# Patient Record
Sex: FEMALE | Race: WHITE | Employment: FULL TIME | URBAN - METROPOLITAN AREA
[De-identification: names, ages, dates, MRNs, and addresses within clinical notes are randomized per-mention and may not be internally consistent; named-entity substitution may affect disease eponyms.]

---

## 2017-02-06 ENCOUNTER — HOSPITAL ENCOUNTER (OUTPATIENT)
Dept: MAMMOGRAPHY | Age: 42
Discharge: HOME | End: 2017-02-06
Attending: FAMILY MEDICINE
Payer: COMMERCIAL

## 2017-02-06 DIAGNOSIS — Z12.31 VISIT FOR SCREENING MAMMOGRAM: ICD-10-CM

## 2017-02-06 PROCEDURE — 77067 SCR MAMMO BI INCL CAD: CPT

## 2017-04-28 ENCOUNTER — OFFICE VISIT (OUTPATIENT)
Dept: CARDIOLOGY CLINIC | Age: 42
End: 2017-04-28

## 2017-04-28 VITALS
HEIGHT: 65 IN | HEART RATE: 88 BPM | WEIGHT: 213 LBS | BODY MASS INDEX: 35.49 KG/M2 | SYSTOLIC BLOOD PRESSURE: 100 MMHG | DIASTOLIC BLOOD PRESSURE: 70 MMHG | OXYGEN SATURATION: 98 %

## 2017-04-28 DIAGNOSIS — R01.0 FUNCTIONAL MURMUR: Primary | ICD-10-CM

## 2017-04-28 RX ORDER — LEVOTHYROXINE SODIUM 25 UG/1
75 TABLET ORAL
COMMUNITY

## 2017-04-28 RX ORDER — CHOLECALCIFEROL TAB 125 MCG (5000 UNIT) 125 MCG
TAB ORAL
COMMUNITY

## 2017-04-28 RX ORDER — ACETAMINOPHEN 160 MG/5ML
SUSPENSION, ORAL (FINAL DOSE FORM) ORAL DAILY
COMMUNITY

## 2017-04-28 NOTE — PROGRESS NOTES
HPI: I saw Gem Gibson in my office today in cardiovascular evaluation due to a heart murmur. Dr. David Gibson is a very pleasant 38year old  female with history of recently diagnosed heart murmur by Dr. Cecilia Partida, her gynecologist.  She had a completely normal clinical examination when I saw her about a year ago except for very soft grade 9-8-5 systolic ejection murmur which I thought could simply be a flow murmur murmur or possibly related to bicuspid aortic valve. I decided to do an echocardiogram which was completed in April 8, 2016 and was completely within normal limits with a normally trileaflet aortic valve without any significant sclerosis and it was felt that her heart murmur was simply a functional flow murmur. She comes into the office today and relates that she is doing quite well. She just had a knee replacement with the last few months by Dr. Kaela Richardson and is recovering from that surgery and seems to be doing quite well. Encounter Diagnosis   Name Primary?  Functional murmur Yes       Discussion: This lady simply is a very mild functional flow murmur which I think was lateral last year because she had an anemia. In view of the fact that her echocardiogram appeared to be completely normal I told her that I did not feel that she needs to follow-up with me in the future and could just follow-up if any new cardiovascular symptoms developed.     PCP: Juan C Armando MD      Past Medical History:   Diagnosis Date    Allergic rhinitis 2/11/2010    Arrhythmia     heart murmur    Asthma 2/11/2010    controlled without meds    Chronic back pain 2/11/2010    Fracture of trapezoidal bone of wrist ,96    left    GERD (gastroesophageal reflux disease) 2/11/2010    Migraine headache 2/11/2010    Osteoarthritis of left knee 10/6/2016    Osteoarthritis of left knee 10/6/2016    Seborrheic dermatitis 2/11/2010    Sleep apnea     was tested, but does not need a CPAP       Past Surgical History:   Procedure Laterality Date    HX HYSTERECTOMY  2010    HX ORTHOPAEDIC  summer '07    left foot screw, fx right foot '06    HX TONSILLECTOMY         Current Outpatient Rx   Name  Route  Sig  Dispense  Refill    Cetirizine 10 mg cap    Oral    Take  by mouth.  gabapentin (NEURONTIN) 600 mg tablet    Oral    Take  by mouth daily.  FERROUS FUMARATE (IRON PO)    Oral    Take  by mouth daily.  FOLIC ACID/MULTIVIT-MIN/LUTEIN (CENTRUM SILVER PO)    Oral    Take  by mouth.  ibuprofen (MOTRIN) 400 mg tablet    Oral    Take 1 Tab by mouth every six (6) hours as needed. With food    360 Tab    3      loratadine (CLARITIN) 10 mg tablet    Oral    Take 1 Tab by mouth daily as needed. 90 Tab    3      GLUC HCL/GLUC SAUCEDA/AC-D-GLUCOS (GLUCOSAMINE COMPLEX PO)    Oral    Take  by mouth two (2) times a day.  omega-3 fatty acids-vitamin e (FISH OIL) 1,000 mg Cap    Oral    Take  by mouth two (2) times a day. Allergies   Allergen Reactions    Bee Sting [Sting, Bee] Unknown (comments)    Venom-Wasp Unknown (comments)       Social History :  Social History   Substance Use Topics    Smoking status: Never Smoker    Smokeless tobacco: Never Used    Alcohol use 4.0 oz/week     4 Cans of beer, 4 Standard drinks or equivalent per week      Comment: a little of both        Family History: family history includes Arthritis-rheumatoid in her maternal grandmother, mother, and paternal grandmother; Cancer in an other family member; Diabetes in her maternal grandmother; Heart Disease in her maternal grandmother. Review of Systems:        Constitutional: Negative. Respiratory: Negative. Cardiovascular: Positive for leg swelling. Negative for chest pain, palpitations, orthopnea, claudication and PND. Gastrointestinal: Negative. Musculoskeletal: Negative.             Physical Exam:    The patient is a cooperative, alert, well developed, well nourished 42 y.o.  female who is in no acute distress at the time of the examination. Visit Vitals    /70    Pulse 88    Ht 5' 5\" (1.651 m)    Wt 96.6 kg (213 lb)    SpO2 98%    BMI 35.45 kg/m2       HEENT: Conjuctiva white, mucosa moist, no pallor or cyanosis. NECK: Supple without masses, tenderness or thyromegaly. There was no jugular venous distention. Carotid are full bilaterally without bruits. CHEST: Symmetrical with good excursion. LUNGS: Clear to auscultation in all fields. HEART: The apex is not displaced. There were no lifts, thrills or heaves. There is a normal S1 and S2. There is a grade I/VI MONTRELL along the LSB with radiation to the base without appreciable murmurs, rubs, clicks, or gallops auscultated. ABDOMEN: Soft without masses, tenderness or organomegaly. EXTREMITIES: Full peripheral pulses without peripheral edema. Review of Data: See PMH and Cardiology and Imaging sections for cardiac testing  No results found for: CHOL, CHOLX, CHLST, CHOLV, HDL, LDL, DLDL, LDLC, DLDLP, TGL, TGLX, TRIGL, TRIGP, CHHD, CHHDX    Results for orders placed or performed in visit on 04/28/17   AMB POC EKG ROUTINE W/ 12 LEADS, INTER & REP     Status: None    Narrative    Normal sinus rhythm, rate 88. This EKG is within normal limits and similar to the EKG of April 7, 2016. Carlee Melissa D.O., F.A.C.C. Cardiovascular Specialists  [facility] [place]  [address] [place] [address]. [address] [facility]  [phone]    PLEASE NOTE:  This document has been produced using voice recognition software. Unrecognized errors in transcription may be present.

## 2017-04-28 NOTE — PROGRESS NOTES
1. Have you been to the ER, urgent care clinic since your last visit? Hospitalized since your last visit? Yes 10/13/16-10/14/16  Left knee femoral joint replacement     2. Have you seen or consulted any other health care providers outside of the [address] [place] [address] since your last visit? Include any pap smears or colon screening.  No

## 2017-04-28 NOTE — PROGRESS NOTES
Review of Systems   Constitutional: Negative. Respiratory: Negative. Cardiovascular: Positive for leg swelling. Negative for chest pain, palpitations, orthopnea, claudication and PND. Gastrointestinal: Negative. Musculoskeletal: Negative.

## 2018-02-13 ENCOUNTER — HOSPITAL ENCOUNTER (OUTPATIENT)
Dept: MAMMOGRAPHY | Age: 43
Discharge: HOME | End: 2018-02-13
Attending: FAMILY MEDICINE
Payer: COMMERCIAL

## 2018-02-13 DIAGNOSIS — Z12.31 VISIT FOR SCREENING MAMMOGRAM: ICD-10-CM

## 2018-02-13 PROCEDURE — 77067 SCR MAMMO BI INCL CAD: CPT

## 2018-02-13 PROCEDURE — 77063 BREAST TOMOSYNTHESIS BI: CPT

## 2018-05-29 ENCOUNTER — OFFICE VISIT (OUTPATIENT)
Dept: CARDIOLOGY CLINIC | Age: 43
End: 2018-05-29

## 2018-05-29 VITALS
OXYGEN SATURATION: 97 % | BODY MASS INDEX: 35.49 KG/M2 | DIASTOLIC BLOOD PRESSURE: 72 MMHG | HEIGHT: 65 IN | WEIGHT: 213 LBS | HEART RATE: 73 BPM | SYSTOLIC BLOOD PRESSURE: 116 MMHG

## 2018-05-29 DIAGNOSIS — R01.1 MURMUR: Primary | ICD-10-CM

## 2018-05-29 PROBLEM — E66.01 SEVERE OBESITY (BMI 35.0-39.9) WITH COMORBIDITY (HCC): Status: ACTIVE | Noted: 2018-05-29

## 2018-05-29 NOTE — PROGRESS NOTES
HPI:  I saw Vipin Wyman in my office today in cardiovascular evaluation due to a heart murmur. DrJabier Wyman is a very pleasant 38year old  female with history of recently diagnosed heart murmur by Dr. Mitchel Gibson, her gynecologist.  She had a completely normal clinical examination when I saw her about a year ago except for very soft grade 2-5/9 systolic ejection murmur which I thought could simply be a flow murmur murmur or possibly related to bicuspid aortic valve. I decided to do an echocardiogram which was completed in April 8, 2016 and was completely within normal limits with a normally trileaflet aortic valve without any significant sclerosis and it was felt that her heart murmur was simply a functional flow murmur. Encounter Diagnosis   Name Primary?  Functional systolic murmur Yes       Discussion: This lady continues to do quite well from a cardiovascular vantage. She is not having any symptoms to suggest the development of symptomatic obstructive coronary disease or heart failure and her heart murmur continues to be very minimal and clearly functional in my view.       Consequently, I have told her that unless she has new symptoms of chest pain, shortness of breath, or her family physician feels that her heart murmurs getting worse and wants us to reevaluate her that she should just follow-up with her family physician Dr. Jack Croft and I will see her on an as-needed basis in the future    PCP: Amelia Whaley MD      Past Medical History:   Diagnosis Date    Allergic rhinitis 2/11/2010    Arrhythmia     heart murmur    Asthma 2/11/2010    controlled without meds    Chronic back pain 2/11/2010    Fracture of trapezoidal bone of wrist ,96    left    GERD (gastroesophageal reflux disease) 2/11/2010    Migraine headache 2/11/2010    Osteoarthritis of left knee 10/6/2016    Osteoarthritis of left knee 10/6/2016    Seborrheic dermatitis 2/11/2010    Sleep apnea     was tested, but does not need a CPAP       Past Surgical History:   Procedure Laterality Date    HX HYSTERECTOMY  2010    HX ORTHOPAEDIC  summer '07    left foot screw, fx right foot '06    HX TONSILLECTOMY         Current Outpatient Rx   Name  Route  Sig  Dispense  Refill    Cetirizine 10 mg cap    Oral    Take  by mouth.  gabapentin (NEURONTIN) 600 mg tablet    Oral    Take  by mouth daily.  FERROUS FUMARATE (IRON PO)    Oral    Take  by mouth daily.  FOLIC ACID/MULTIVIT-MIN/LUTEIN (CENTRUM SILVER PO)    Oral    Take  by mouth.  ibuprofen (MOTRIN) 400 mg tablet    Oral    Take 1 Tab by mouth every six (6) hours as needed. With food    360 Tab    3      loratadine (CLARITIN) 10 mg tablet    Oral    Take 1 Tab by mouth daily as needed. 90 Tab    3      GLUC HCL/GLUC SAUCEDA/AC-D-GLUCOS (GLUCOSAMINE COMPLEX PO)    Oral    Take  by mouth two (2) times a day.  omega-3 fatty acids-vitamin e (FISH OIL) 1,000 mg Cap    Oral    Take  by mouth two (2) times a day. Allergies   Allergen Reactions    Bee Sting [Sting, Bee] Unknown (comments)    Venom-Wasp Unknown (comments)       Social History :  Social History   Substance Use Topics    Smoking status: Never Smoker    Smokeless tobacco: Never Used    Alcohol use 4.0 oz/week     4 Cans of beer, 4 Standard drinks or equivalent per week      Comment: a little of both        Family History: family history includes Arthritis-rheumatoid in her maternal grandmother, mother, and paternal grandmother; Cancer in an other family member; Diabetes in her maternal grandmother; Heart Disease in her maternal grandmother. Review of Systems:   Constitutional: Negative. Respiratory: Negative. Cardiovascular: Negative. Gastrointestinal: Negative. Musculoskeletal: Negative for falls, joint pain and myalgias. Neurological: Negative for dizziness.      Physical Exam:    The patient is a cooperative, alert, well developed, well nourished 36 y.o.  female who is in no acute distress at the time of the examination. Visit Vitals    /72    Pulse 73    Ht 5' 5\" (1.651 m)    Wt 96.6 kg (213 lb)    SpO2 97%    BMI 35.45 kg/m2       HEENT: Conjuctiva white, mucosa moist, no pallor or cyanosis. NECK: Supple without masses, tenderness or thyromegaly. There was no jugular venous distention. Carotid are full bilaterally without bruits. CHEST: Symmetrical with good excursion. LUNGS: Clear to auscultation in all fields. HEART: The apex is not displaced. There were no lifts, thrills or heaves. There is a normal S1 and S2. There is a grade I/VI MONTRELL along the LSB with radiation to the base without appreciable murmurs, rubs, clicks, or gallops auscultated. ABDOMEN: Soft without masses, tenderness or organomegaly. EXTREMITIES: Full peripheral pulses without peripheral edema. Review of Data: See PMH and Cardiology and Imaging sections for cardiac testing      Results for orders placed or performed in visit on 05/29/18   AMB POC EKG ROUTINE W/ 12 LEADS, INTER & REP     Status: None    Narrative    Normal sinus rhythm, rate 73. Low voltage in the precordial leads and borderline low voltage in the limb leads, but otherwise the tracing is within normal limits. This is similar to the tracing of April 28, 2017. Shanti Canales D.O., F.A.C.C. Cardiovascular Specialists  [facility] [place]  [address] [place] [address]. [address] [facility]  [phone]    PLEASE NOTE:  This document has been produced using voice recognition software. Unrecognized errors in transcription may be present.

## 2018-05-29 NOTE — PROGRESS NOTES
1. Have you been to the ER, urgent care clinic since your last visit? Hospitalized since your last visit?no    2. Have you seen or consulted any other health care providers outside of the [facility] since your last visit? Include any pap smears or colon screening.  no

## 2018-05-29 NOTE — PROGRESS NOTES
Review of Systems   Constitutional: Negative. Respiratory: Negative. Cardiovascular: Negative. Gastrointestinal: Negative. Musculoskeletal: Negative for falls, joint pain and myalgias. Neurological: Negative for dizziness.

## 2019-04-12 ENCOUNTER — HOSPITAL ENCOUNTER (OUTPATIENT)
Dept: MAMMOGRAPHY | Age: 44
Discharge: HOME | End: 2019-04-12
Attending: FAMILY MEDICINE
Payer: COMMERCIAL

## 2019-04-12 DIAGNOSIS — Z12.31 VISIT FOR SCREENING MAMMOGRAM: ICD-10-CM

## 2019-04-12 PROCEDURE — 77063 BREAST TOMOSYNTHESIS BI: CPT

## 2019-07-31 ENCOUNTER — OFFICE VISIT (OUTPATIENT)
Dept: CARDIOLOGY CLINIC | Age: 44
End: 2019-07-31

## 2019-07-31 VITALS
SYSTOLIC BLOOD PRESSURE: 116 MMHG | BODY MASS INDEX: 38.15 KG/M2 | WEIGHT: 229 LBS | HEIGHT: 65 IN | HEART RATE: 63 BPM | DIASTOLIC BLOOD PRESSURE: 72 MMHG | OXYGEN SATURATION: 98 %

## 2019-07-31 DIAGNOSIS — R01.1 MURMUR: Primary | ICD-10-CM

## 2019-07-31 NOTE — PROGRESS NOTES
HPI:   I saw Maria C Henriquez in my office today in cardiovascular evaluation due to a heart murmur. Dr. Adiel Henriquez is a very pleasant 39year old  female with history of recently diagnosed heart murmur by Dr. Loli Lafleur, her gynecologist. Amy Aguirre had a completely normal clinical examination when I saw her initially except for very soft grade 2-3/9 systolic ejection murmur which I thought could simply be a flow murmur murmur or possibly related to bicuspid aortic valve.  I decided to do an echocardiogram which was completed in 2016 and was completely within normal limits with a normally trileaflet aortic valve without any significant sclerosis and it was felt that her heart murmur was simply a functional flow murmur. She has a mother who  of congestive heart failure and the patient herself is concerned about the development of heart failure over time so she has been following with me on an annual basis. She comes in today and relates that she is feeling fine continuing to be active and denies any cardiovascular symptomatology. Encounter Diagnosis   Name Primary?  Functional systolic murmur Yes       Discussion: This lady appears to be doing quite well from a cardiovascular vantage and I have no recommendations for change. Her functional heart murmur has disappeared and she has no heart murmur today and she is not having any symptoms to suggest the development of heart failure at this juncture. I do not have a copy of her most recent cholesterol and I told her that if her cholesterol is borderline elevated as I suspect it may be that as she gets older particularly in the 50 timeframe she might want to consider getting a coronary calcium score since if that was elevated we might treat her cholesterol when we otherwise would not just related to her cholesterol level and risk factors.     I told her to give me a call if she has any problems with shortness of breath on exertion, orthopnea, palpitations, or chest pain or fatigue issues and since she is otherwise doing well I will plan to see her again in a year. PCP: Param Bowman MD      Past Medical History:   Diagnosis Date    Allergic rhinitis 2/11/2010    Arrhythmia     heart murmur    Asthma 2/11/2010    controlled without meds    Chronic back pain 2/11/2010    Fracture of trapezoidal bone of wrist ,96    left    GERD (gastroesophageal reflux disease) 2/11/2010    Migraine headache 2/11/2010    Osteoarthritis of left knee 10/6/2016    Osteoarthritis of left knee 10/6/2016    Seborrheic dermatitis 2/11/2010    Sleep apnea     was tested, but does not need a CPAP       Past Surgical History:   Procedure Laterality Date    HX HYSTERECTOMY  2010    HX ORTHOPAEDIC  summer '07    left foot screw, fx right foot '06    HX TONSILLECTOMY         Current Outpatient Medications   Medication Sig    conjugated estrogens (PREMARIN) 0.625 mg tablet Take  by mouth.  levothyroxine (SYNTHROID) 25 mcg tablet Take 75 mcg by mouth Daily (before breakfast).  omega 3-dha-epa-fish oil 100-160-1,000 mg cap Take  by mouth.  cholecalciferol, VITAMIN D3, (VITAMIN D3) 5,000 unit tab tablet Take  by mouth daily.  coenzyme Q-10 (CO Q-10) 200 mg capsule Take  by mouth daily.  GINKGO BILOBA (GINKOBA PO) Take  by mouth.  ferrous sulfate (IRON) 325 mg (65 mg iron) tablet Take  by mouth Daily (before breakfast). Indications: IRON DEFICIENCY ANEMIA    Cetirizine 10 mg cap Take  by mouth.  gabapentin (NEURONTIN) 600 mg tablet Take 600 mg by mouth daily. Indications: NEUROPATHIC PAIN    FOLIC ACID/MULTIVIT-MIN/LUTEIN (CENTRUM SILVER PO) Take  by mouth. No current facility-administered medications for this visit.          Allergies   Allergen Reactions    Bee Sting [Sting, Bee] Unknown (comments)    Venom-Wasp Unknown (comments)       Social History :  Social History     Tobacco Use    Smoking status: Never Smoker    Smokeless tobacco: Never Used   Substance Use Topics    Alcohol use: Yes     Alcohol/week: 6.7 standard drinks     Types: 4 Cans of beer, 4 Standard drinks or equivalent per week     Comment: a little of both        Family History: family history includes Arthritis-rheumatoid in her maternal grandmother, mother, and paternal grandmother; Cancer in an other family member; Diabetes in her maternal grandmother; Heart Disease in her maternal grandmother. Review of Systems:   Constitutional: Negative. Respiratory: Negative. Cardiovascular: Negative. Gastrointestinal: Negative. Musculoskeletal: Negative for falls, joint pain and myalgias. Neurological: Negative for dizziness. Physical Exam:    The patient is a cooperative, alert, well developed, well nourished 39 y.o.  female who is in no acute distress at the time of the examination. Visit Vitals  /72   Pulse 63   Ht 5' 5\" (1.651 m)   Wt 103.9 kg (229 lb)   SpO2 98%   BMI 38.11 kg/m²       HEENT: Conjuctiva white, mucosa moist, no pallor or cyanosis. NECK: Supple without masses, tenderness or thyromegaly. There was no jugular venous distention. Carotid are full bilaterally without bruits. CHEST: Symmetrical with good excursion. LUNGS: Clear to auscultation in all fields. HEART: The apex is not displaced. There were no lifts, thrills or heaves. There is a normal S1 and S2 without appreciable murmurs, rubs, clicks, or gallops auscultated. ABDOMEN: Soft without masses, tenderness or organomegaly. EXTREMITIES: Full peripheral pulses without peripheral edema. Review of Data: See PMH and Cardiology and Imaging sections for cardiac testing      Results for orders placed or performed in visit on 07/31/19   AMB POC EKG ROUTINE W/ 12 LEADS, INTER & REP     Status: None    Narrative    Normal sinus rhythm, rate 63. Low voltage throughout the tracing. This EKG is otherwise within normal limits and similar to the EKG of May 29, 2018. Thiago Acevedo D.O., FJANICEC. Cardiovascular Specialists  [facility] [place]  [address]. [address] [facility]  [phone]    PLEASE NOTE:  This document has been produced using voice recognition software. Unrecognized errors in transcription may be present.

## 2020-07-06 ENCOUNTER — HOSPITAL ENCOUNTER (OUTPATIENT)
Dept: MAMMOGRAPHY | Age: 45
Discharge: HOME | End: 2020-07-06
Attending: FAMILY MEDICINE
Payer: COMMERCIAL

## 2020-07-06 DIAGNOSIS — Z12.31 VISIT FOR SCREENING MAMMOGRAM: ICD-10-CM

## 2020-07-06 PROCEDURE — 77063 BREAST TOMOSYNTHESIS BI: CPT

## 2020-07-22 ENCOUNTER — OFFICE VISIT (OUTPATIENT)
Dept: CARDIOLOGY CLINIC | Age: 45
End: 2020-07-22

## 2020-07-22 VITALS
SYSTOLIC BLOOD PRESSURE: 130 MMHG | HEIGHT: 65 IN | OXYGEN SATURATION: 98 % | WEIGHT: 224 LBS | BODY MASS INDEX: 37.32 KG/M2 | DIASTOLIC BLOOD PRESSURE: 70 MMHG | HEART RATE: 73 BPM

## 2020-07-22 DIAGNOSIS — R01.1 MURMUR: Primary | ICD-10-CM

## 2020-07-22 NOTE — PROGRESS NOTES
HPI:   I saw Bhupendra Juarez in my office today in cardiovascular evaluation due to a heart murmur. Dr. Daniel Juarez is a very pleasant 38year old  female with history of having a heart murmur diagnosed by Dr. Jeni Peoples, her gynecologist. Mike Kim had a completely normal clinical examination when I saw her initially except for very soft grade 6-1/9 systolic ejection murmur which I thought could simply be a flow murmur murmur or possibly related to bicuspid aortic valve.  I decided to do an echocardiogram which was completed in 2016 and was completely within normal limits with a normally trileaflet aortic valve without any significant sclerosis and it was felt that her heart murmur was simply a functional flow murmur. She has a mother who  of congestive heart failure and the patient herself is concerned about the development of heart failure over time so she has been following with me on an annual basis at her request.      She comes in today and relates that she is feeling fine continuing to be active and denies any cardiovascular symptomatology. Encounter Diagnosis   Name Primary?  Functional systolic murmur Yes       Discussion: This lady appears to be doing quite well from a cardiovascular vantage and I have no recommendations for change. Her functional heart murmur has disappeared and she has no heart murmur today and she is not having any symptoms to suggest the development of heart failure at this juncture. Her most recent lipid profile which was completed on May 5, 2020 showed total cholesterol 145 with triglycerides of 82, HDL of 70, VLDL of 16, and LDL of 59 which I think is a very good level for patient not on statin drugs and would suggest it would be unlikely for this lady to develop significant obstructive disease moving forward.     She otherwise appears to be doing well with only complaint being a little intermittent swelling in her ankles which I think is from venous insufficiency and since she otherwise is doing well I am simply going to have her followed on an annual basis due to her concern for the potential for developing issues moving forward by my associate Dr. Camilo Vallejo,     PCP: Adolfo Espinoza MD      Past Medical History:   Diagnosis Date    Allergic rhinitis 2/11/2010    Arrhythmia     heart murmur    Asthma 2/11/2010    controlled without meds    Chronic back pain 2/11/2010    Fracture of trapezoidal bone of wrist ,96    left    GERD (gastroesophageal reflux disease) 2/11/2010    Migraine headache 2/11/2010    Osteoarthritis of left knee 10/6/2016    Osteoarthritis of left knee 10/6/2016    Seborrheic dermatitis 2/11/2010    Sleep apnea     was tested, but does not need a CPAP       Past Surgical History:   Procedure Laterality Date    HX HYSTERECTOMY  2010    HX ORTHOPAEDIC  summer '07    left foot screw, fx right foot '06    HX TONSILLECTOMY         Current Outpatient Medications   Medication Sig    conjugated estrogens (PREMARIN) 0.625 mg tablet Take  by mouth.  levothyroxine (SYNTHROID) 25 mcg tablet Take 75 mcg by mouth Daily (before breakfast).  omega 3-dha-epa-fish oil 100-160-1,000 mg cap Take  by mouth.  cholecalciferol, VITAMIN D3, (VITAMIN D3) 5,000 unit tab tablet Take  by mouth daily.  coenzyme Q-10 (CO Q-10) 200 mg capsule Take  by mouth daily.  GINKGO BILOBA (GINKOBA PO) Take  by mouth.  ferrous sulfate (IRON) 325 mg (65 mg iron) tablet Take  by mouth Daily (before breakfast). Indications: IRON DEFICIENCY ANEMIA    Cetirizine 10 mg cap Take  by mouth.  FOLIC ACID/MULTIVIT-MIN/LUTEIN (CENTRUM SILVER PO) Take  by mouth.  gabapentin (NEURONTIN) 600 mg tablet Take 600 mg by mouth daily. Indications: NEUROPATHIC PAIN     No current facility-administered medications for this visit.          Allergies   Allergen Reactions    Bee Sting [Sting, Bee] Unknown (comments)    Venom-Wasp Unknown (comments)       Social History :  Social History     Tobacco Use    Smoking status: Never Smoker    Smokeless tobacco: Never Used   Substance Use Topics    Alcohol use: Yes     Alcohol/week: 6.7 standard drinks     Types: 4 Cans of beer, 4 Standard drinks or equivalent per week     Comment: a little of both        Family History: family history includes Arthritis-rheumatoid in her maternal grandmother, mother, and paternal grandmother; Cancer in an other family member; Diabetes in her maternal grandmother; Heart Disease in her maternal grandmother. Review of Systems:   Constitutional: Negative. Respiratory: Negative. Cardiovascular: Negative. Gastrointestinal: Negative. Musculoskeletal: Negative for falls, joint pain and myalgias. Neurological: Negative for dizziness. Physical Exam:    The patient is a cooperative, alert, well developed, well nourished [address] y.o.  female who is in no acute distress at the time of the examination. Visit Vitals  /70   Pulse 73   Ht 5' 5\" (1.651 m)   Wt 224 lb (101.6 kg)   SpO2 98%   BMI 37.28 kg/m²       HEENT: Conjuctiva white, mucosa moist, no pallor or cyanosis. NECK: Supple without masses, tenderness or thyromegaly. There was no jugular venous distention. Carotid are full bilaterally without bruits. CHEST: Symmetrical with good excursion. LUNGS: Clear to auscultation in all fields. HEART: The apex is not displaced. There were no lifts, thrills or heaves. There is a normal S1 and S2 without appreciable murmurs, rubs, clicks, or gallops auscultated. ABDOMEN: Soft without masses, tenderness or organomegaly. EXTREMITIES: Full peripheral pulses without peripheral edema. Review of Data: See PMH and Cardiology and Imaging sections for cardiac testing      Results for orders placed or performed in visit on 07/22/20   AMB POC EKG ROUTINE W/ 12 LEADS, INTER & REP     Status: None    Narrative    Normal sinus rhythm, rate 73. Low voltage in the precordial leads. This EKG is otherwise within normal limits and similar to the EKG of July 31, 2019. Randell Escalante D.O., FJANICEC. [facility]  [facility]  [address] [place] [address]. [address] [facility]  [phone]    PLEASE NOTE:  This document has been produced using voice recognition software. Unrecognized errors in transcription may be present.

## 2020-10-14 ENCOUNTER — APPOINTMENT (OUTPATIENT)
Dept: GENERAL RADIOLOGY | Age: 45
End: 2020-10-14
Attending: EMERGENCY MEDICINE
Payer: COMMERCIAL

## 2020-10-14 ENCOUNTER — HOSPITAL ENCOUNTER (EMERGENCY)
Age: 45
Discharge: HOME | End: 2020-10-15
Attending: EMERGENCY MEDICINE
Payer: COMMERCIAL

## 2020-10-14 VITALS
OXYGEN SATURATION: 96 % | WEIGHT: 224 LBS | DIASTOLIC BLOOD PRESSURE: 102 MMHG | SYSTOLIC BLOOD PRESSURE: 140 MMHG | HEART RATE: 83 BPM | TEMPERATURE: 98.4 F | RESPIRATION RATE: 16 BRPM | BODY MASS INDEX: 37.28 KG/M2

## 2020-10-14 DIAGNOSIS — R06.00 DYSPNEA, UNSPECIFIED TYPE: Primary | ICD-10-CM

## 2020-10-14 DIAGNOSIS — U07.1 COVID-19: ICD-10-CM

## 2020-10-14 LAB
ALBUMIN SERPL-MCNC: 3.6 G/DL (ref 3.4–5)
ALBUMIN/GLOB SERPL: 0.9 {RATIO} (ref 0.8–1.7)
ALP SERPL-CCNC: 57 U/L (ref 45–117)
ALT SERPL-CCNC: 32 U/L (ref 13–56)
ANION GAP SERPL CALC-SCNC: 3 MMOL/L (ref 3–18)
APTT PPP: 30.8 SEC (ref 23–36.4)
AST SERPL-CCNC: 21 U/L (ref 10–38)
BASOPHILS # BLD: 0 K/UL (ref 0–0.1)
BASOPHILS NFR BLD: 0 % (ref 0–2)
BILIRUB SERPL-MCNC: 0.2 MG/DL (ref 0.2–1)
BUN SERPL-MCNC: 9 MG/DL (ref 7–18)
BUN/CREAT SERPL: 11 (ref 12–20)
CALCIUM SERPL-MCNC: 9.2 MG/DL (ref 8.5–10.1)
CHLORIDE SERPL-SCNC: 109 MMOL/L (ref 100–111)
CO2 SERPL-SCNC: 29 MMOL/L (ref 21–32)
CREAT SERPL-MCNC: 0.82 MG/DL (ref 0.6–1.3)
CRP SERPL-MCNC: 1.7 MG/DL (ref 0–0.3)
D DIMER PPP FEU-MCNC: 0.4 UG/ML(FEU)
DIFFERENTIAL METHOD BLD: ABNORMAL
EOSINOPHIL # BLD: 0.1 K/UL (ref 0–0.4)
EOSINOPHIL NFR BLD: 2 % (ref 0–5)
ERYTHROCYTE [DISTWIDTH] IN BLOOD BY AUTOMATED COUNT: 11.6 % (ref 11.6–14.5)
FIBRINOGEN PPP-MCNC: 316 MG/DL (ref 210–451)
GLOBULIN SER CALC-MCNC: 4 G/DL (ref 2–4)
GLUCOSE SERPL-MCNC: 99 MG/DL (ref 74–99)
HCT VFR BLD AUTO: 38.4 % (ref 35–45)
HGB BLD-MCNC: 13.3 G/DL (ref 12–16)
INR PPP: 1 (ref 0.8–1.2)
LYMPHOCYTES # BLD: 1.7 K/UL (ref 0.9–3.6)
LYMPHOCYTES NFR BLD: 55 % (ref 21–52)
MAGNESIUM SERPL-MCNC: 1.8 MG/DL (ref 1.6–2.6)
MCH RBC QN AUTO: 32 PG (ref 24–34)
MCHC RBC AUTO-ENTMCNC: 34.6 G/DL (ref 31–37)
MCV RBC AUTO: 92.5 FL (ref 74–97)
MONOCYTES # BLD: 0.3 K/UL (ref 0.05–1.2)
MONOCYTES NFR BLD: 9 % (ref 3–10)
NEUTS SEG # BLD: 1.1 K/UL (ref 1.8–8)
NEUTS SEG NFR BLD: 34 % (ref 40–73)
PLATELET # BLD AUTO: 127 K/UL (ref 135–420)
PMV BLD AUTO: 11.1 FL (ref 9.2–11.8)
POTASSIUM SERPL-SCNC: 3.9 MMOL/L (ref 3.5–5.5)
PROT SERPL-MCNC: 7.6 G/DL (ref 6.4–8.2)
PROTHROMBIN TIME: 12.7 SEC (ref 11.5–15.2)
RBC # BLD AUTO: 4.15 M/UL (ref 4.2–5.3)
SODIUM SERPL-SCNC: 141 MMOL/L (ref 136–145)
TROPONIN I SERPL-MCNC: <0.02 NG/ML (ref 0–0.04)
WBC # BLD AUTO: 3.1 K/UL (ref 4.6–13.2)

## 2020-10-14 PROCEDURE — 86140 C-REACTIVE PROTEIN: CPT

## 2020-10-14 PROCEDURE — 84145 PROCALCITONIN (PCT): CPT

## 2020-10-14 PROCEDURE — 85025 COMPLETE CBC W/AUTO DIFF WBC: CPT

## 2020-10-14 PROCEDURE — 93005 ELECTROCARDIOGRAM TRACING: CPT

## 2020-10-14 PROCEDURE — 83735 ASSAY OF MAGNESIUM: CPT

## 2020-10-14 PROCEDURE — 85730 THROMBOPLASTIN TIME PARTIAL: CPT

## 2020-10-14 PROCEDURE — 71045 X-RAY EXAM CHEST 1 VIEW: CPT

## 2020-10-14 PROCEDURE — 99282 EMERGENCY DEPT VISIT SF MDM: CPT

## 2020-10-14 PROCEDURE — 80053 COMPREHEN METABOLIC PANEL: CPT

## 2020-10-14 PROCEDURE — 85384 FIBRINOGEN ACTIVITY: CPT

## 2020-10-14 PROCEDURE — 85379 FIBRIN DEGRADATION QUANT: CPT

## 2020-10-14 PROCEDURE — 84484 ASSAY OF TROPONIN QUANT: CPT

## 2020-10-14 PROCEDURE — 85610 PROTHROMBIN TIME: CPT

## 2020-10-15 LAB
ATRIAL RATE: 66 BPM
CALCULATED P AXIS, ECG09: 67 DEGREES
CALCULATED R AXIS, ECG10: 69 DEGREES
CALCULATED T AXIS, ECG11: 65 DEGREES
DIAGNOSIS, 93000: NORMAL
P-R INTERVAL, ECG05: 172 MS
PROCALCITONIN SERPL-MCNC: <0.05 NG/ML
Q-T INTERVAL, ECG07: 400 MS
QRS DURATION, ECG06: 86 MS
QTC CALCULATION (BEZET), ECG08: 419 MS
VENTRICULAR RATE, ECG03: 66 BPM

## 2020-10-15 NOTE — ED TRIAGE NOTES
Pt arrived for SOB with cough and positive COVID test on Friday. Pt reports her symptoms have been worse for several hours.

## 2020-10-15 NOTE — ED PROVIDER NOTES
Marco Frey is a 54 y.o. female with past medical history of well-controlled asthma coming in complaining of shortness of breath. Patient states that she had a positive Covid-19 test about a week ago. Patient states she has had some dry cough as well as some myalgias over the last week. States that she was tested initially because she had a close contact at work that tested positive. Patient states that earlier this afternoon she had chest pressure and tightness as well as shortness of breath. Patient states that she felt like she could not take a full breath. She states that this has improved some and she does not feel dyspneic right now at rest.  She denies any sharp or pleuritic chest pain. Denies any hemoptysis. Denies any history of DVT/PE. Patient does state that she takes Premarin, conjugated estrogen for hormone replacement therapy after total hysterectomy. Denies smoking. Patient has no other complaints at this time.            Past Medical History:   Diagnosis Date    Allergic rhinitis 2/11/2010    Arrhythmia     heart murmur    Asthma 2/11/2010    controlled without meds    Chronic back pain 2/11/2010    Fracture of trapezoidal bone of wrist ,96    left    GERD (gastroesophageal reflux disease) 2/11/2010    Migraine headache 2/11/2010    Osteoarthritis of left knee 10/6/2016    Osteoarthritis of left knee 10/6/2016    Seborrheic dermatitis 2/11/2010    Sleep apnea     was tested, but does not need a CPAP       Past Surgical History:   Procedure Laterality Date    HX HYSTERECTOMY  2010    HX ORTHOPAEDIC  summer '07    left foot screw, fx right foot '06    HX TONSILLECTOMY           Family History:   Problem Relation Age of Onset   Calista.Shola Arthritis-rheumatoid Mother         RA    Cancer Other         GF met [place]    Diabetes Maternal Grandmother     Arthritis-rheumatoid Maternal Grandmother     Heart Disease Maternal Grandmother         murmur    Arthritis-rheumatoid Paternal Grandmother         osteoporosis       Social History     Socioeconomic History    Marital status: SINGLE     Spouse name: Not on file    Number of children: Not on file    Years of education: Not on file    Highest education level: Not on file   Occupational History    Occupation:    Social Needs    Financial resource strain: Not on file    Food insecurity     Worry: Not on file     Inability: Not on file   [employer] needs     Medical: Not on file     Non-medical: Not on file   Tobacco Use    Smoking status: Never Smoker    Smokeless tobacco: Never Used   Substance and Sexual Activity    Alcohol use: Yes     Alcohol/week: 6.7 standard drinks     Types: 4 Cans of beer, 4 Standard drinks or equivalent per week     Comment: a little of both    Drug use: No    Sexual activity: Yes     Partners: Female     Birth control/protection: Pill     Comment: for dysmenorrhea   Lifestyle    Physical activity     Days per week: Not on file     Minutes per session: Not on file    Stress: Not on file   Relationships    Social connections     Talks on phone: Not on file     Gets together: Not on file     Attends Yazidi service: Not on file     Active member of club or organization: Not on file     Attends meetings of clubs or organizations: Not on file     Relationship status: Not on file    Intimate partner violence     Fear of current or ex partner: Not on file     Emotionally abused: Not on file     Physically abused: Not on file     Forced sexual activity: Not on file   Other Topics Concern    Not on file   Social History Narrative    Not on file         ALLERGIES: Bee sting [sting, bee] and Venom-wasp    Review of Systems   Constitutional: Negative. Negative for chills and fever. Respiratory: Positive for cough, chest tightness and shortness of breath. Cardiovascular: Positive for chest pain. Negative for leg swelling. Gastrointestinal: Negative.   Negative for abdominal pain, nausea and vomiting. Musculoskeletal: Positive for myalgias. Skin: Negative. Negative for rash. Neurological: Negative. Negative for dizziness, weakness and light-headedness. All other systems reviewed and are negative. Vitals:    10/14/20 2022   BP: (!) 140/102   Pulse: 83   Resp: 16   Temp: 98.4 °F (36.9 °C)   SpO2: 96%   Weight: 101.6 kg (224 lb)            Physical Exam  Vitals signs reviewed. Constitutional:       General: She is not in acute distress. Appearance: Normal appearance. She is well-developed. HENT:      Head: Normocephalic and atraumatic. Eyes:      Extraocular Movements: Extraocular movements intact. Conjunctiva/sclera: Conjunctivae normal.      Pupils: Pupils are equal, round, and reactive to light. Neck:      Musculoskeletal: Normal range of motion and neck supple. Cardiovascular:      Rate and Rhythm: Normal rate and regular rhythm. Heart sounds: S1 normal and S2 normal. No murmur. No friction rub. No gallop. Pulmonary:      Effort: Pulmonary effort is normal. No accessory muscle usage or respiratory distress. Breath sounds: Normal breath sounds. Comments: Lungs clear to auscultation. Speaking in full sentences. No tachypnea or accessory muscle use. Abdominal:      General: There is no distension. Tenderness: There is no abdominal tenderness. Musculoskeletal: Normal range of motion. General: No tenderness. Right lower leg: No edema. Left lower leg: No edema. Skin:     General: Skin is warm. Findings: No rash. Neurological:      General: No focal deficit present. Mental Status: She is alert and oriented to person, place, and time.    Psychiatric:         Speech: Speech normal.          MDM  Number of Diagnoses or Management Options  COVID-19:   Dyspnea, unspecified type:   Diagnosis management comments: Marco Frey is a 54 y.o. female coming in with increasing shortness of breath and chest pressure today after being diagnosed with Covid-19. Differential diagnosis includes worsening pulmonary infiltrates due to Covid-19 pneumonia versus secondary complications such as pulmonary embolism or ACS. Chest x-ray does not show any obvious large infiltrates. Patient is on Premarin which increases her risk for PE. Patient is however, normal cardiac with good O2 sats and does not appear to be in any distress. Will get inflammatory labs and plan for likely CTA to rule out pulmonary embolism. Patient is D-dimer actually negative. Despite systemic infection. Other inflammatory markers also lower than would be expected in Covid-19. Patient has remained asymptomatic while here in the emergency department. O2 sats on percent on room air and heart rate is 68 on the bedside monitor. Given patient's negative D-dimer, resolved symptoms, and vitals I do not feel that she needs advanced CT imaging of the chest at this time. I discussed this with the patient as well as potential risks and benefits and she verbalized understanding and agrees with foregoing CT imaging at this time. Counseled extensively on follow-up and return precautions.          Procedures      Vitals:  Patient Vitals for the past 12 hrs:   Temp Pulse Resp BP SpO2   10/14/20 2022 98.4 °F (36.9 °C) 83 16 (!) 140/102 96 %       Medications ordered:   Medications - No data to display      Lab findings:  Recent Results (from the past 12 hour(s))   CBC WITH AUTOMATED DIFF    Collection Time: 10/14/20 10:07 PM   Result Value Ref Range    WBC 3.1 (L) 4.6 - 13.2 K/uL    RBC 4.15 (L) 4.20 - 5.30 M/uL    HGB 13.3 12.0 - 16.0 g/dL    HCT 38.4 35.0 - 45.0 %    MCV 92.5 74.0 - 97.0 FL    MCH 32.0 24.0 - 34.0 PG    MCHC 34.6 31.0 - 37.0 g/dL    RDW 11.6 11.6 - 14.5 %    PLATELET 042 (L) 423 - 420 K/uL    MPV 11.1 9.2 - 11.8 FL    NEUTROPHILS 34 (L) 40 - 73 %    LYMPHOCYTES 55 (H) 21 - 52 %    MONOCYTES 9 3 - 10 %    EOSINOPHILS 2 0 - 5 %    BASOPHILS 0 0 - 2 % ABS. NEUTROPHILS 1.1 (L) 1.8 - 8.0 K/UL    ABS. LYMPHOCYTES 1.7 0.9 - 3.6 K/UL    ABS. MONOCYTES 0.3 0.05 - 1.2 K/UL    ABS. EOSINOPHILS 0.1 0.0 - 0.4 K/UL    ABS. BASOPHILS 0.0 0.0 - 0.1 K/UL    DF AUTOMATED     METABOLIC PANEL, COMPREHENSIVE    Collection Time: 10/14/20 10:07 PM   Result Value Ref Range    Sodium 141 136 - 145 mmol/L    Potassium 3.9 3.5 - 5.5 mmol/L    Chloride 109 100 - 111 mmol/L    CO2 29 21 - 32 mmol/L    Anion gap 3 3.0 - 18 mmol/L    Glucose 99 74 - 99 mg/dL    BUN 9 7.0 - 18 MG/DL    Creatinine 0.82 0.6 - 1.3 MG/DL    BUN/Creatinine ratio 11 (L) 12 - 20      GFR est AA >60 >60 ml/min/1.73m2    GFR est non-AA >60 >60 ml/min/1.73m2    Calcium 9.2 8.5 - 10.1 MG/DL    Bilirubin, total 0.2 0.2 - 1.0 MG/DL    ALT (SGPT) 32 13 - 56 U/L    AST (SGOT) 21 10 - 38 U/L    Alk. phosphatase 57 45 - 117 U/L    Protein, total 7.6 6.4 - 8.2 g/dL    Albumin 3.6 3.4 - 5.0 g/dL    Globulin 4.0 2.0 - 4.0 g/dL    A-G Ratio 0.9 0.8 - 1.7     MAGNESIUM    Collection Time: 10/14/20 10:07 PM   Result Value Ref Range    Magnesium 1.8 1.6 - 2.6 mg/dL   PROTHROMBIN TIME + INR    Collection Time: 10/14/20 10:07 PM   Result Value Ref Range    Prothrombin time 12.7 11.5 - 15.2 sec    INR 1.0 0.8 - 1.2     PTT    Collection Time: 10/14/20 10:07 PM   Result Value Ref Range    aPTT 30.8 23.0 - 36.4 SEC   FIBRINOGEN    Collection Time: 10/14/20 10:07 PM   Result Value Ref Range    Fibrinogen 316 210 - 451 mg/dL   C REACTIVE PROTEIN, QT    Collection Time: 10/14/20 10:07 PM   Result Value Ref Range    C-Reactive protein 1.7 (H) 0 - 0.3 mg/dL   D DIMER    Collection Time: 10/14/20 10:07 PM   Result Value Ref Range    D DIMER 0.40 <0.46 ug/ml(FEU)   TROPONIN I    Collection Time: 10/14/20 10:07 PM   Result Value Ref Range    Troponin-I, QT <0.02 0.0 - 0.045 NG/ML       EKG interpretation by ED Physician: Sinus rhythm rate of 66 bpm.  No acute ischemic change or evidence of acute right heart strain.     X-Ray, CT or other radiology findings or impressions:  XR CHEST SNGL V    (Results Pending)       Progress notes, Consult notes or additional Procedure notes:     Reevaluation of patient:   I have reassessed the patient. Patient is feeling better. She is asymptomatic now at rest.  D-dimer negative. Counseled on potential short and long-term complications of [id number]. Advised to follow-up with her regular doctor. She is requesting be discharged and I feel that this is reasonable as she is currently asymptomatic with reassuring vitals and negative work-up. Advised to come back immediately for hemoptysis, recurrent shortness of breath, pleuritic chest pain, or other new or worsening symptoms. Patient verbalizes understanding and agrees with this plan. Disposition:  Diagnosis:   1. Dyspnea, unspecified type    2. COVID-19        Disposition: Discharge    Follow-up Information     Follow up With Specialties Details Why Ennis Brunner, MD Family Medicine Schedule an appointment as soon as possible for a visit for office follow up [address]  [place] [address] [facility] EMERGENCY DEPT Emergency Medicine  As needed, If symptoms worsen [address] [place], [place] [address]  [phone]           Patient's Medications   Start Taking    No medications on file   Continue Taking    CETIRIZINE 10 MG CAP    Take  by mouth. CHOLECALCIFEROL, VITAMIN D3, (VITAMIN D3) 5,000 UNIT TAB TABLET    Take  by mouth daily. COENZYME Q-10 (CO Q-10) 200 MG CAPSULE    Take  by mouth daily. CONJUGATED ESTROGENS (PREMARIN) 0.625 MG TABLET    Take  by mouth. FERROUS SULFATE (IRON) 325 MG (65 MG IRON) TABLET    Take  by mouth Daily (before breakfast). Indications: IRON DEFICIENCY ANEMIA    FOLIC ACID/MULTIVIT-MIN/LUTEIN (CENTRUM SILVER PO)    Take  by mouth. GABAPENTIN (NEURONTIN) 600 MG TABLET    Take 600 mg by mouth daily.  Indications: NEUROPATHIC PAIN    GINKGO BILOBA (GINKOBA PO)    Take by mouth. LEVOTHYROXINE (SYNTHROID) 25 MCG TABLET    Take 75 mcg by mouth Daily (before breakfast). OMEGA 3-DHA-EPA-FISH -160-1,000 MG CAP    Take  by mouth.    These Medications have changed    No medications on file   Stop Taking    No medications on file

## 2020-10-15 NOTE — DISCHARGE INSTRUCTIONS
Patient Education        Shortness of Breath: Care Instructions  Your Care Instructions     Shortness of breath has many causes. Sometimes conditions such as anxiety can lead to shortness of breath. Some people get mild shortness of breath when they exercise. Trouble breathing also can be a symptom of a serious problem, such as asthma, lung disease, emphysema, heart problems, and pneumonia. If your shortness of breath continues, you may need tests and treatment. Watch for any changes in your breathing and other symptoms. Follow-up care is a key part of your treatment and safety. Be sure to make and go to all appointments, and call your doctor if you are having problems. It's also a good idea to know your test results and keep a list of the medicines you take. How can you care for yourself at home? · Do not smoke or allow others to smoke around you. If you need help quitting, talk to your doctor about stop-smoking programs and medicines. These can increase your chances of quitting for good. · Get plenty of rest and sleep. · Take your medicines exactly as prescribed. Call your doctor if you think you are having a problem with your medicine. · Find healthy ways to deal with stress. ? Exercise daily. ? Get plenty of sleep. ? Eat regularly and well. When should you call for help? Call 911 anytime you think you may need emergency care. For example, call if:    · You have severe shortness of breath.     · You have symptoms of a heart attack. These may include:  ? Chest pain or pressure, or a strange feeling in the chest.  ? Sweating. ? Shortness of breath. ? Nausea or vomiting. ? Pain, pressure, or a strange feeling in the back, neck, jaw, or upper belly or in one or both shoulders or arms. ? Lightheadedness or sudden weakness. ? A fast or irregular heartbeat. After you call 911, the  may tell you to chew 1 adult-strength or 2 to 4 low-dose aspirin. Wait for an ambulance.  Do not try to drive yourself. Call your doctor now or seek immediate medical care if:    · Your shortness of breath gets worse or you start to wheeze. Wheezing is a high-pitched sound when you breathe.     · You wake up at night out of breath or have to prop your head up on several pillows to breathe.     · You are short of breath after only light activity or while at rest.   Watch closely for changes in your health, and be sure to contact your doctor if:    · You do not get better over the next 1 to 2 days. Where can you learn more? Go to http://www.gray.com/  Enter S780 in the search box to learn more about \"Shortness of Breath: Care Instructions. \"  Current as of: February 24, 2020               Content Version: 12.6  © 2006-2020 [employer]. Care instructions adapted under license by [employer] (which disclaims liability or warranty for this information). If you have questions about a medical condition or this instruction, always ask your healthcare professional. [place] [address] any warranty or liability for your use of this information. Patient Education        Coronavirus (AHEBX-45): Care Instructions  Overview  The coronavirus disease (COVID-19) is caused by a virus. Symptoms may include a fever, a cough, and shortness of breath. It mainly spreads person-to-person through droplets from coughing and sneezing. The virus also can spread when people are in close contact with someone who is infected. Most people have mild symptoms and can take care of themselves at home. If their symptoms get worse, they may need care in a hospital. Treatment may include medicines to reduce symptoms, plus breathing support such as oxygen therapy or a ventilator. It's important to not spread the virus to others. If you have COVID-19, wear a face cover anytime you are around other people. You need to isolate yourself while you are sick.  Leave your home only if you need to get medical care or testing. Follow-up care is a key part of your treatment and safety. Be sure to make and go to all appointments, and call your doctor if you are having problems. It's also a good idea to know your test results and keep a list of the medicines you take. How can you care for yourself at home? · Get extra rest. It can help you feel better. · Drink plenty of fluids. This helps replace fluids lost from fever. Fluids also help ease a scratchy throat. Water, soup, fruit juice, and hot tea with lemon are good choices. · Take acetaminophen (such as Tylenol) to reduce a fever. It may also help with muscle aches. Read and follow all instructions on the label. · Use petroleum jelly on sore skin. This can help if the skin around your nose and lips becomes sore from rubbing a lot with tissues. Tips for self-isolation  · Limit contact with people in your home. If possible, stay in a separate bedroom and use a separate bathroom. · Wear a cloth face cover when you are around other people. It can help stop the spread of the virus when you cough or sneeze. · If you have to leave home, avoid crowds and try to stay at least 6 feet away from other people. · Avoid contact with pets and other animals. · Cover your mouth and nose with a tissue when you cough or sneeze. Then throw it in the trash right away. · Wash your hands often, especially after you cough or sneeze. Use soap and water, and scrub for at least 20 seconds. If soap and water aren't available, use an alcohol-based hand . · Don't share personal household items. These include bedding, towels, cups and glasses, and eating utensils. · [address] in the warmest water allowed for the fabric type, and dry it completely. It's okay to wash other people's laundry with yours. · Clean and disinfect your home every day. Use household  and disinfectant wipes or sprays. Take special care to clean things that you grab with your hands. These include doorknobs, remote controls, phones, and handles on your refrigerator and microwave. And don't forget countertops, tabletops, bathrooms, and computer keyboards. When you can end self-isolation  · If you know or suspect that you have COVID-19, stay in self-isolation until:  ? You haven't had a fever for 3 days, and  ? Your symptoms have improved, and  ? It's been at least 10 days since your symptoms started. · Talk to your doctor about whether you also need testing, especially if you have a weakened immune system. When should you call for help? Call 911 anytime you think you may need emergency care. For example, call if you have life-threatening symptoms, such as:    · You have severe trouble breathing. (You can't talk at all.)     · You have constant chest pain or pressure.     · You are severely dizzy or lightheaded.     · You are confused or can't think clearly.     · Your face and lips have a blue color.     · You pass out (lose consciousness) or are very hard to wake up. Call your doctor now or seek immediate medical care if:    · You have moderate trouble breathing. (You can't speak a full sentence.)     · You are coughing up blood (more than about 1 teaspoon).     · You have signs of low blood pressure. These include feeling lightheaded; being too weak to stand; and having cold, pale, clammy skin. Watch closely for changes in your health, and be sure to contact your doctor if:    · Your symptoms get worse.     · You are not getting better as expected. Call before you go to the doctor's office. Follow their instructions. And wear a cloth face cover. Current as of: July 10, 2020               Content Version: 12.6  © 7430-4998 [employer]. Care instructions adapted under license by [employer] (which disclaims liability or warranty for this information).  If you have questions about a medical condition or this instruction, always ask your healthcare professional. [employer] disclaims any warranty or liability for your use of this information.

## 2020-10-15 NOTE — ED NOTES
I have reviewed discharge instructions with the patient. The patient verbalized understanding. Pt ambulated to [facility].

## 2020-10-16 ENCOUNTER — PATIENT OUTREACH (OUTPATIENT)
Dept: CASE MANAGEMENT | Age: 45
End: 2020-10-16

## 2020-10-16 NOTE — PROGRESS NOTES
Date/Time:  10/16/2020 9:17 AM  Attempted to reach patient by telephone. Left HIPPA compliant message requesting a return call. Will attempt to reach patient again.

## 2020-10-19 ENCOUNTER — PATIENT OUTREACH (OUTPATIENT)
Dept: CASE MANAGEMENT | Age: 45
End: 2020-10-19

## 2020-10-19 NOTE — PROGRESS NOTES
Date/Time:  10/19/2020 2:15 PM  2nd attempt to reach patient by telephone. Left HIPPA compliant message requesting a return call. This episode is resolved.

## 2020-12-01 ENCOUNTER — OFFICE VISIT (OUTPATIENT)
Dept: CARDIOLOGY CLINIC | Age: 45
End: 2020-12-01
Payer: COMMERCIAL

## 2020-12-01 VITALS
DIASTOLIC BLOOD PRESSURE: 70 MMHG | HEIGHT: 65 IN | BODY MASS INDEX: 38.49 KG/M2 | SYSTOLIC BLOOD PRESSURE: 112 MMHG | HEART RATE: 59 BPM | WEIGHT: 231 LBS | OXYGEN SATURATION: 98 %

## 2020-12-01 DIAGNOSIS — R07.9 CHEST PAIN, UNSPECIFIED TYPE: ICD-10-CM

## 2020-12-01 DIAGNOSIS — R01.1 MURMUR: Primary | ICD-10-CM

## 2020-12-01 PROCEDURE — 93000 ELECTROCARDIOGRAM COMPLETE: CPT | Performed by: INTERNAL MEDICINE

## 2020-12-01 PROCEDURE — 99214 OFFICE O/P EST MOD 30 MIN: CPT | Performed by: INTERNAL MEDICINE

## 2020-12-01 RX ORDER — MULTIVITAMIN
TABLET ORAL 2 TIMES DAILY
COMMUNITY

## 2020-12-01 RX ORDER — MINERAL OIL
ENEMA (ML) RECTAL
COMMUNITY

## 2020-12-01 NOTE — PROGRESS NOTES
HPI:   I saw Alejandrina Diggs in my office today in cardiovascular evaluation due to some chest pain she had recently. DrJabier Ashley Jackyosi is a very pleasant 54year old  female who is a local  with history of having a heart murmur diagnosed by Dr. Christal Lemus, her gynecologist. Miguel Hamilton had a completely normal clinical examination when I saw her initially except for very soft grade 0-3/0 systolic ejection murmur which I thought could simply be a flow murmur murmur or possibly related to bicuspid aortic valve.  I decided to do an echocardiogram which was completed in 2016 and was completely within normal limits with a normally trileaflet aortic valve without any significant sclerosis and it was felt that her heart murmur was simply a functional flow murmur. She has a mother who  of congestive heart failure and the patient herself is concerned about the development of heart failure over time so she has been following with me on an annual basis at her request.      She comes in today because the beginning of 2020 she was diagnosed with COVID-19 and several days later on 2020 she went to the emergency room due to some chest pressure and tightness as well as shortness of breath. The patient had had a dry cough and some myalgias for the first week after she was diagnosed with COVID-19, but due to the symptoms of chest pain she went to the ER for evaluation and had no significant abnormalities with a completely normal EKG although the patient said she did have some PVCs on telemetry. Her troponin I was less than 0.02 and her other laboratory tests were normal except for a little leukopenia with a white blood cell count of 3100 and 55 lymphocytes on the differential.    Encounter Diagnoses   Name Primary?  Chest pain, associated with Covid 19 probably atypical angina     Functional systolic murmur Yes       Discussion:  This lady appears to have a significant episode of chest tightness and shortness of breath which lasted for couple of hours and developed several days after she was diagnosed with COVID-19. She did have very severe myalgias in her back during the previous week so this discomfort could have been simply musculoskeletal but the associated shortness of breath and mild but persistent chest tightness certainly sounds somewhat anginal and could very well have been a cardiac manifestation of COVID-19. However, she currently is completely asymptomatic and has been over the past 6 weeks since she had this episode which lasted for a couple of hours so I do not feel that any further testing is needed at this time. However, if she develops recurrent problems with chest tightness or significant shortness of breath and fatigue over short period of time she will let us know and we would consider doing further testing including a pharmacologic myocardial perfusion study. She otherwise appears to be doing well at this time so unless she develops any of the above issues she will simply follow up with for my associate Dr. Edilma Marie in several months as has previously been scheduled since I am going to be retiring at the end of this month.     PCP: Shubham Ortiz MD      Past Medical History:   Diagnosis Date    Allergic rhinitis 2/11/2010    Arrhythmia     heart murmur    Asthma 2/11/2010    controlled without meds    Chronic back pain 2/11/2010    Fracture of trapezoidal bone of wrist ,96    left    GERD (gastroesophageal reflux disease) 2/11/2010    Migraine headache 2/11/2010    Osteoarthritis of left knee 10/6/2016    Osteoarthritis of left knee 10/6/2016    Seborrheic dermatitis 2/11/2010    Sleep apnea     was tested, but does not need a CPAP       Past Surgical History:   Procedure Laterality Date    HX HYSTERECTOMY  2010    HX ORTHOPAEDIC  summer '07    left foot screw, fx right foot '06    HX TONSILLECTOMY         Current Outpatient Medications Medication Sig    cinnamon bark (Cinnamon) 500 mg cap Take  by mouth two (2) times a day.  fexofenadine (ALLEGRA) 180 mg tablet Take  by mouth.  conjugated estrogens (PREMARIN) 0.625 mg tablet Take  by mouth.  levothyroxine (SYNTHROID) 25 mcg tablet Take 75 mcg by mouth Daily (before breakfast).  omega 3-dha-epa-fish oil 100-160-1,000 mg cap Take  by mouth.  cholecalciferol, VITAMIN D3, (VITAMIN D3) 5,000 unit tab tablet Take  by mouth two (2) days a week.  coenzyme Q-10 (CO Q-10) 200 mg capsule Take  by mouth daily.  GINKGO BILOBA (GINKOBA PO) Take  by mouth.  ferrous sulfate (IRON) 325 mg (65 mg iron) tablet Take  by mouth Daily (before breakfast). Indications: IRON DEFICIENCY ANEMIA    Cetirizine 10 mg cap Take  by mouth.  FOLIC ACID/MULTIVIT-MIN/LUTEIN (CENTRUM SILVER PO) Take  by mouth.  gabapentin (NEURONTIN) 600 mg tablet Take 600 mg by mouth daily. Indications: NEUROPATHIC PAIN     No current facility-administered medications for this visit. Allergies   Allergen Reactions    Bee Sting [Sting, Bee] Unknown (comments)    Venom-Wasp Unknown (comments)       Social History :  Social History     Tobacco Use    Smoking status: Never Smoker    Smokeless tobacco: Never Used   Substance Use Topics    Alcohol use: Yes     Alcohol/week: 6.7 standard drinks     Types: 4 Cans of beer, 4 Standard drinks or equivalent per week     Comment: a little of both        Family History: family history includes Arthritis-rheumatoid in her maternal grandmother, mother, and paternal grandmother; Cancer in an other family member; Diabetes in her maternal grandmother; Heart Disease in her maternal grandmother. Review of Systems:   Constitutional: Negative. Respiratory: Negative. Cardiovascular: Negative. Gastrointestinal: Negative. Musculoskeletal: Negative for falls, joint pain and myalgias. Neurological: Negative for dizziness.      Physical Exam:    The patient is a cooperative, alert, well developed, well nourished 54 y.o.  female who is in no acute distress at the time of the examination. Visit Vitals  /70   Pulse (!) 59   Ht 5' 5\" (1.651 m)   Wt 104.8 kg (231 lb)   SpO2 98%   BMI 38.44 kg/m²       HEENT: Conjuctiva white, mucosa moist, no pallor or cyanosis. NECK: Supple without masses, tenderness or thyromegaly. There was no jugular venous distention. Carotid are full bilaterally without bruits. CHEST: Symmetrical with good excursion. LUNGS: Clear to auscultation in all fields. HEART: The apex is not displaced. There were no lifts, thrills or heaves. There is a normal S1 and S2 without appreciable murmurs, rubs, clicks, or gallops auscultated. ABDOMEN: Soft without masses, tenderness or organomegaly. EXTREMITIES: Full peripheral pulses without peripheral edema. Review of Data: See PMH and Cardiology and Imaging sections for cardiac testing      Results for orders placed or performed in visit on 12/01/20   AMB POC EKG ROUTINE W/ 12 LEADS, INTER & REP     Status: None    Narrative    Sinus bradycardia rate 59. Low voltage in the precordial leads. Compared to the EKG of October 14, 2020 there was little interval change. Marie Bernard D.O., F.A.C.C. [facility]  [facility]  [address] [place] [address]. [address] [facility]  [phone]    PLEASE NOTE:  This document has been produced using voice recognition software. Unrecognized errors in transcription may be present.

## 2021-07-21 ENCOUNTER — OFFICE VISIT (OUTPATIENT)
Dept: CARDIOLOGY CLINIC | Age: 46
End: 2021-07-21
Payer: COMMERCIAL

## 2021-07-21 VITALS
HEART RATE: 85 BPM | OXYGEN SATURATION: 99 % | WEIGHT: 211 LBS | SYSTOLIC BLOOD PRESSURE: 122 MMHG | DIASTOLIC BLOOD PRESSURE: 82 MMHG | BODY MASS INDEX: 35.16 KG/M2 | HEIGHT: 65 IN

## 2021-07-21 DIAGNOSIS — R01.1 MURMUR: Primary | ICD-10-CM

## 2021-07-21 PROCEDURE — 99214 OFFICE O/P EST MOD 30 MIN: CPT | Performed by: INTERNAL MEDICINE

## 2021-07-21 PROCEDURE — 93000 ELECTROCARDIOGRAM COMPLETE: CPT | Performed by: INTERNAL MEDICINE

## 2021-07-21 RX ORDER — FLUCONAZOLE 150 MG/1
150 TABLET ORAL
COMMUNITY

## 2021-07-21 RX ORDER — ESTERIFIED ESTROGEN AND METHYLTESTOSTERONE .625; 1.25 MG/1; MG/1
1 TABLET ORAL DAILY
COMMUNITY

## 2021-07-21 RX ORDER — PHENTERMINE HYDROCHLORIDE 30 MG/1
30 CAPSULE ORAL
COMMUNITY

## 2021-07-21 NOTE — PROGRESS NOTES
Valarie Agarwal    PVCs, +FH    HPI    Valarie sheikh a 54 y.o. is a very pleasant 54year old  female who is a local  with history of having a heart murmur diagnosed by Dr. Zakiya Vazquez, her gynecologist. Ramana Peng had a completely normal clinical examination when I saw her initially except for very soft grade 0-7/0 systolic ejection murmur which I thought could simply be a flow murmur murmur or possibly related to bicuspid aortic valve.  I decided to do an echocardiogram which was completed in 2016 and was completely within normal limits with a normally trileaflet aortic valve without any significant sclerosis and it was felt that her heart murmur was simply a functional flow murmur.     She has a mother who  of congestive heart failure and the patient herself is concerned about the development of heart failure over time so she has been following with our practice on an annual basis at her request.       She comes in today because the beginning of 2020 she was diagnosed with COVID-19 and several days later on 2020 she went to the emergency room due to some chest pressure and tightness as well as shortness of breath. The patient had had a dry cough and some myalgias for the first week after she was diagnosed with COVID-19, but due to the symptoms of chest pain she went to the ER for evaluation and had no significant abnormalities with a completely normal EKG although the patient said she did have some PVCs on telemetry.   Her troponin I was less than 0.02 and her other laboratory tests were normal except for a little leukopenia with a white blood cell count of 3100 and 55 lymphocytes on the differential.       Past Medical History:   Diagnosis Date    Allergic rhinitis 2010    Arrhythmia     heart murmur    Asthma 2010    controlled without meds    Chronic back pain 2010    Fracture of trapezoidal bone of wrist ,96    left    GERD (gastroesophageal reflux disease) 2/11/2010    Migraine headache 2/11/2010    Osteoarthritis of left knee 10/6/2016    Osteoarthritis of left knee 10/6/2016    Seborrheic dermatitis 2/11/2010    Sleep apnea     was tested, but does not need a CPAP       Past Surgical History:   Procedure Laterality Date    HX HYSTERECTOMY  2010   Cleophus Livers ORTHOPAEDIC  summer '07    left foot screw, fx right foot '06    HX TONSILLECTOMY         Current Outpatient Medications   Medication Sig Dispense Refill    estrogens, conjugated,-methylTESTOSTERone (ESTRATEST HS) 0.625-1.25 mg per tablet Take 1 Tablet by mouth daily.  fluconazole (DIFLUCAN) 150 mg tablet Take 150 mg by mouth. 1 tablet once a week.  Lactobacillus acidophilus (PROBIOTIC PO) Take  by mouth daily.  phentermine 30 mg capsule Take 30 mg by mouth every morning.  cinnamon bark (Cinnamon) 500 mg cap Take  by mouth two (2) times a day.  fexofenadine (ALLEGRA) 180 mg tablet Take  by mouth.  levothyroxine (SYNTHROID) 25 mcg tablet Take 75 mcg by mouth Daily (before breakfast).  omega 3-dha-epa-fish oil 100-160-1,000 mg cap Take  by mouth.  cholecalciferol, VITAMIN D3, (VITAMIN D3) 5,000 unit tab tablet Take  by mouth two (2) days a week.  coenzyme Q-10 (CO Q-10) 200 mg capsule Take  by mouth daily.  GINKGO BILOBA (GINKOBA PO) Take  by mouth.  ferrous sulfate (IRON) 325 mg (65 mg iron) tablet Take  by mouth Daily (before breakfast). Indications: IRON DEFICIENCY ANEMIA      Cetirizine 10 mg cap Take  by mouth.  FOLIC ACID/MULTIVIT-MIN/LUTEIN (CENTRUM SILVER PO) Take  by mouth.          Allergies   Allergen Reactions    Bee Sting [Sting, Bee] Unknown (comments)    Venom-Wasp Unknown (comments)       Social History     Socioeconomic History    Marital status: SINGLE     Spouse name: Not on file    Number of children: Not on file    Years of education: Not on file    Highest education level: Not on file   Occupational History    Occupation:    Tobacco Use    Smoking status: Never Smoker    Smokeless tobacco: Never Used   Substance and Sexual Activity    Alcohol use: Yes     Alcohol/week: 6.7 standard drinks     Types: 4 Cans of beer, 4 Standard drinks or equivalent per week     Comment: a little of both    Drug use: No    Sexual activity: Yes     Partners: Female     Birth control/protection: Pill     Comment: for dysmenorrhea   Other Topics Concern    Not on file   Social History Narrative    Not on file     Social Determinants of Health     Financial Resource Strain:     Difficulty of Paying Living Expenses:    Food Insecurity:     Worried About Running Out of Food in the Last Year:     Ran Out of Food in the Last Year:    Transportation Needs:     Lack of Transportation (Medical):  Lack of Transportation (Non-Medical):    Physical Activity:     Days of Exercise per Week:     Minutes of Exercise per Session:    Stress:     Feeling of Stress :    Social Connections:     Frequency of Communication with Friends and Family:     Frequency of Social Gatherings with Friends and Family:     Attends Muslim Services:     Active Member of Clubs or Organizations:     Attends Club or Organization Meetings:     Marital Status:    Intimate Partner Violence:     Fear of Current or Ex-Partner:     Emotionally Abused:     Physically Abused:     Sexually Abused:     vet    FH:see HPI    Review of Systems    14 pt Review of Systems is negative unless otherwise mentioned in the HPI.     Wt Readings from Last 3 Encounters:   07/21/21 95.7 kg (211 lb)   12/01/20 104.8 kg (231 lb)   10/14/20 101.6 kg (224 lb)     Temp Readings from Last 3 Encounters:   10/14/20 98.4 °F (36.9 °C)   10/20/16 98.2 °F (36.8 °C)   10/15/16 97.3 °F (36.3 °C)     BP Readings from Last 3 Encounters:   07/21/21 122/82   12/01/20 112/70   10/14/20 (!) 140/102     Pulse Readings from Last 3 Encounters:   07/21/21 85   12/01/20 (!) 59   10/14/20 83 Physical Exam:    Visit Vitals  /82 (BP 1 Location: Left upper arm, BP Patient Position: Sitting, BP Cuff Size: Adult)   Pulse 85   Wt 95.7 kg (211 lb)   LMP 02/04/2010   SpO2 99%   BMI 35.11 kg/m²      Physical Exam  HENT:      Head: Normocephalic and atraumatic. Eyes:      Pupils: Pupils are equal, round, and reactive to light. Cardiovascular:      Rate and Rhythm: Normal rate and regular rhythm. Heart sounds: Normal heart sounds. No murmur heard. No friction rub. No gallop. Pulmonary:      Effort: Pulmonary effort is normal. No respiratory distress. Breath sounds: Normal breath sounds. No wheezing or rales. Chest:      Chest wall: No tenderness. Abdominal:      General: Bowel sounds are normal.      Palpations: Abdomen is soft. Musculoskeletal:         General: No tenderness. Skin:     General: Skin is warm and dry. Neurological:      Mental Status: She is alert and oriented to person, place, and time. EKG today shows: NSR, normal axis and intervals, no ST segment abnormalities    No results found for: CHOL, CHOLPOCT, CHOLX, CHLST, CHOLV, HDL, HDLPOC, HDLP, LDL, LDLCPOC, LDLC, DLDLP, VLDLC, VLDL, TGLX, TRIGL, TRIGP, TGLPOCT, CHHD, CHHDX      Impression and Plan:  Dolly De La Rosa is a 54 y.o. with:    +FH CHF in mother  H/o PACs? H/o benign murmur/ negative echo  S/p COVID    No further testing  RTC yearly prevention appts    Pt says \"I have second degree AVB\" and there is no documentation of this. Dr. Slime Mendoza note says PVCs but also dont see documentation on ekgs etc. 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 Dr. Laine Dickinson has given her reassurance and told her she doesn't need surveillance but she says really prefers to come yearly. Regardless, I told her ectopy is benign and nothing different to do.     Thank you for allowing me to participate in the care of your patient, please do not hesitate to call with questions or concerns.     [address],    Susan Elliott, DO

## 2021-07-21 NOTE — PROGRESS NOTES
Ishan Amezcua presents today for   Chief Complaint   Patient presents with    Follow-up     prev [facility] pt; 1 year f/u       Ishan Amezcua preferred language for health care discussion is english/other. Is someone accompanying this pt? no    Is the patient using any DME equipment during OV? no    Depression Screening:  No flowsheet data found. Learning Assessment:  Learning Assessment 5/29/2018   PRIMARY LEARNER Patient   HIGHEST LEVEL OF EDUCATION - PRIMARY LEARNER  GRADUATED HIGH SCHOOL OR GED   BARRIERS PRIMARY LEARNER NONE   CO-LEARNER CAREGIVER No   PRIMARY LANGUAGE ENGLISH    NEED -   LEARNER PREFERENCE PRIMARY READING   ANSWERED BY patient   RELATIONSHIP SELF       Abuse Screening:  No flowsheet data found. Fall Risk  No flowsheet data found. Pt currently taking Anticoagulant therapy? ASA 81mg    Coordination of Care:  1. Have you been to the ER, urgent care clinic since your last visit? Hospitalized since your last visit? no    2. Have you seen or consulted any other health care providers outside of the [address] [place] [address] since your last visit? Include any pap smears or colon screening.  no

## 2021-07-28 ENCOUNTER — TRANSCRIBE ORDER (OUTPATIENT)
Dept: SCHEDULING | Age: 46
End: 2021-07-28

## 2021-07-28 DIAGNOSIS — Z12.31 VISIT FOR SCREENING MAMMOGRAM: Primary | ICD-10-CM

## 2021-08-19 ENCOUNTER — HOSPITAL ENCOUNTER (OUTPATIENT)
Dept: MAMMOGRAPHY | Age: 46
Discharge: HOME | End: 2021-08-19
Attending: NURSE PRACTITIONER
Payer: COMMERCIAL

## 2021-08-19 DIAGNOSIS — Z12.31 VISIT FOR SCREENING MAMMOGRAM: ICD-10-CM

## 2021-08-19 PROCEDURE — 77067 SCR MAMMO BI INCL CAD: CPT

## 2022-03-19 PROBLEM — E66.01 SEVERE OBESITY (BMI 35.0-39.9) WITH COMORBIDITY (HCC): Status: ACTIVE | Noted: 2018-05-29

## 2022-03-19 PROBLEM — R01.1 MURMUR: Status: ACTIVE | Noted: 2018-05-29

## 2022-07-21 ENCOUNTER — OFFICE VISIT (OUTPATIENT)
Dept: CARDIOLOGY CLINIC | Age: 47
End: 2022-07-21
Payer: COMMERCIAL

## 2022-07-21 ENCOUNTER — HOSPITAL ENCOUNTER (OUTPATIENT)
Dept: LAB | Age: 47
Discharge: HOME | End: 2022-07-21
Payer: COMMERCIAL

## 2022-07-21 VITALS
BODY MASS INDEX: 31.12 KG/M2 | SYSTOLIC BLOOD PRESSURE: 115 MMHG | HEIGHT: 65 IN | WEIGHT: 186.8 LBS | OXYGEN SATURATION: 100 % | DIASTOLIC BLOOD PRESSURE: 72 MMHG

## 2022-07-21 DIAGNOSIS — R07.9 CHEST PAIN, UNSPECIFIED TYPE: Primary | ICD-10-CM

## 2022-07-21 LAB
ALBUMIN SERPL-MCNC: 3.8 G/DL (ref 3.4–5)
ALBUMIN/GLOB SERPL: 1.2 {RATIO} (ref 0.8–1.7)
ALP SERPL-CCNC: 47 U/L (ref 45–117)
ALT SERPL-CCNC: 28 U/L (ref 13–56)
ANION GAP SERPL CALC-SCNC: 6 MMOL/L (ref 3–18)
AST SERPL-CCNC: 19 U/L (ref 10–38)
BILIRUB SERPL-MCNC: 0.7 MG/DL (ref 0.2–1)
BUN SERPL-MCNC: 18 MG/DL (ref 7–18)
BUN/CREAT SERPL: 20 (ref 12–20)
CALCIUM SERPL-MCNC: 9 MG/DL (ref 8.5–10.1)
CHLORIDE SERPL-SCNC: 107 MMOL/L (ref 100–111)
CHOLEST SERPL-MCNC: 132 MG/DL
CO2 SERPL-SCNC: 29 MMOL/L (ref 21–32)
CREAT SERPL-MCNC: 0.9 MG/DL (ref 0.6–1.3)
GLOBULIN SER CALC-MCNC: 3.3 G/DL (ref 2–4)
GLUCOSE SERPL-MCNC: 81 MG/DL (ref 74–99)
HBV SURFACE AB SER QL IA: POSITIVE
HBV SURFACE AB SERPL IA-ACNC: >1000 MIU/ML
HDLC SERPL-MCNC: 59 MG/DL (ref 40–60)
HDLC SERPL: 2.2 {RATIO} (ref 0–5)
HEP BS AB COMMENT,HBSAC: NORMAL
LDLC SERPL CALC-MCNC: 59.6 MG/DL (ref 0–100)
LIPID PROFILE,FLP: NORMAL
POTASSIUM SERPL-SCNC: 4.2 MMOL/L (ref 3.5–5.5)
PROT SERPL-MCNC: 7.1 G/DL (ref 6.4–8.2)
RHEUMATOID FACT SERPL-ACNC: <10 IU/ML
SODIUM SERPL-SCNC: 142 MMOL/L (ref 136–145)
T4 FREE SERPL-MCNC: 1.2 NG/DL (ref 0.7–1.5)
TRIGL SERPL-MCNC: 67 MG/DL (ref ?–150)
TSH SERPL DL<=0.05 MIU/L-ACNC: 2.22 UIU/ML (ref 0.36–3.74)
VLDLC SERPL CALC-MCNC: 13.4 MG/DL

## 2022-07-21 PROCEDURE — 80061 LIPID PANEL: CPT

## 2022-07-21 PROCEDURE — 99214 OFFICE O/P EST MOD 30 MIN: CPT | Performed by: INTERNAL MEDICINE

## 2022-07-21 PROCEDURE — 86431 RHEUMATOID FACTOR QUANT: CPT

## 2022-07-21 PROCEDURE — 86706 HEP B SURFACE ANTIBODY: CPT

## 2022-07-21 PROCEDURE — 93000 ELECTROCARDIOGRAM COMPLETE: CPT | Performed by: INTERNAL MEDICINE

## 2022-07-21 PROCEDURE — 36415 COLL VENOUS BLD VENIPUNCTURE: CPT

## 2022-07-21 PROCEDURE — 84439 ASSAY OF FREE THYROXINE: CPT

## 2022-07-21 PROCEDURE — 80053 COMPREHEN METABOLIC PANEL: CPT

## 2022-07-21 PROCEDURE — 84443 ASSAY THYROID STIM HORMONE: CPT

## 2022-07-21 RX ORDER — VALACYCLOVIR HYDROCHLORIDE 1 G/1
500 TABLET, FILM COATED ORAL
COMMUNITY
Start: 2022-05-02

## 2022-07-21 RX ORDER — PHENTERMINE HYDROCHLORIDE 37.5 MG/1
37.5 CAPSULE ORAL DAILY
COMMUNITY
Start: 2022-05-08

## 2022-07-21 RX ORDER — DM/PE/ACETAMINOPHEN/CHLORPHENR 10-5-325-2
1000 TABLET, SEQUENTIAL ORAL DAILY
COMMUNITY

## 2022-07-21 NOTE — PROGRESS NOTES
Shea Diggs    PVCs, +FH    HPI    Annalise Schrader is a 51 y.o. is a very pleasant 54year old  female who is a local  with history of having a heart murmur diagnosed by Dr. Tay Chapin, her gynecologist.  She had a completely normal clinical examination when except for very soft grade 1-7/4 systolic ejection murmur which was thought could simply be a flow murmur murmur or possibly related to bicuspid aortic valve. Echocardiogram which was completed in 2016 and was completely within normal limits with a normally trileaflet aortic valve without any significant sclerosis and it was felt that her heart murmur was simply a functional flow murmur. She has a mother who  of congestive heart failure and the patient herself is concerned about the development of heart failure over time so she has been following with our practice on an annual basis at her request.      2020 she was diagnosed with COVID-19 and several days later on 2020 she went to the emergency room due to some chest pressure and tightness as well as shortness of breath. The patient had had a dry cough and some myalgias for the first week after she was diagnosed with COVID-19, but due to the symptoms of chest pain she went to the ER for evaluation and had no significant abnormalities with a completely normal EKG although the patient said she did have some PVCs on telemetry. Her troponin I was less than 0.02 and her other laboratory tests were normal except for a little leukopenia with a white blood cell count of 3100 and 55 lymphocytes on the differential.    She has no CV complaints currently. Going for eye sx. Can have some puffiness in her legs sporadically.        Past Medical History:   Diagnosis Date    Allergic rhinitis 2010    Arrhythmia     heart murmur    Asthma 2010    controlled without meds    Chronic back pain 2010    Fracture of trapezoidal bone of wrist ,96    left    GERD (gastroesophageal reflux disease) 2/11/2010    Migraine headache 2/11/2010    Osteoarthritis of left knee 10/6/2016    Osteoarthritis of left knee 10/6/2016    Seborrheic dermatitis 2/11/2010    Sleep apnea     was tested, but does not need a CPAP       Past Surgical History:   Procedure Laterality Date    HX HYSTERECTOMY  2010    HX ORTHOPAEDIC  summer '07    left foot screw, fx right foot '06    HX TONSILLECTOMY         Current Outpatient Medications   Medication Sig Dispense Refill    glucosamine (Glucosamine Relief) 1,000 mg tab Take 1,000 mg by mouth daily. phentermine 37.5 mg capsule Take 37.5 mg by mouth daily. valACYclovir (VALTREX) 1 gram tablet Take 500 mg by mouth DIALYSIS PRN. estrogens, conjugated,-methylTESTOSTERone (ESTRATEST HS) 0.625-1.25 mg per tablet Take 1 Tablet by mouth daily. fluconazole (DIFLUCAN) 150 mg tablet Take 150 mg by mouth. 1 tablet once a week. Lactobacillus acidophilus (PROBIOTIC PO) Take  by mouth daily. phentermine 30 mg capsule Take 30 mg by mouth every morning. cinnamon bark (Cinnamon) 500 mg cap Take  by mouth two (2) times a day. fexofenadine (ALLEGRA) 180 mg tablet Take  by mouth.      levothyroxine (SYNTHROID) 25 mcg tablet Take 75 mcg by mouth Daily (before breakfast). omega 3-dha-epa-fish oil 100-160-1,000 mg cap Take  by mouth. cholecalciferol, VITAMIN D3, (VITAMIN D3) 5,000 unit tab tablet Take  by mouth two (2) days a week. coenzyme Q-10 (CO Q-10) 200 mg capsule Take  by mouth daily. GINKGO BILOBA (GINKOBA PO) Take  by mouth. ferrous sulfate (IRON) 325 mg (65 mg iron) tablet Take  by mouth Daily (before breakfast). Indications: IRON DEFICIENCY ANEMIA      Cetirizine 10 mg cap Take  by mouth. FOLIC ACID/MULTIVIT-MIN/LUTEIN (CENTRUM SILVER PO) Take  by mouth.          Allergies   Allergen Reactions    Bee Sting [Sting, Bee] Unknown (comments)    Venom-Wasp Unknown (comments)       Social History Socioeconomic History    Marital status: SINGLE     Spouse name: Not on file    Number of children: Not on file    Years of education: Not on file    Highest education level: Not on file   Occupational History    Occupation:    Tobacco Use    Smoking status: Never    Smokeless tobacco: Never   Substance and Sexual Activity    Alcohol use: Yes     Alcohol/week: 6.7 standard drinks     Types: 4 Cans of beer, 4 Standard drinks or equivalent per week     Comment: a little of both    Drug use: No    Sexual activity: Yes     Partners: Female     Birth control/protection: Pill     Comment: for dysmenorrhea   Other Topics Concern    Not on file   Social History Narrative    Not on file     Social Determinants of Health     Financial Resource Strain: Not on file   Food Insecurity: Not on file   Transportation Needs: Not on file   Physical Activity: Not on file   Stress: Not on file   Social Connections: Not on file   Intimate Partner Violence: Not on file   Housing Stability: Not on file    vet    FH:see HPI    Review of Systems    14 pt Review of Systems is negative unless otherwise mentioned in the HPI. Wt Readings from Last 3 Encounters:   07/21/22 84.7 kg (186 lb 12.8 oz)   07/21/21 95.7 kg (211 lb)   12/01/20 104.8 kg (231 lb)     Temp Readings from Last 3 Encounters:   10/14/20 98.4 °F (36.9 °C)   10/20/16 98.2 °F (36.8 °C)   10/15/16 97.3 °F (36.3 °C)     BP Readings from Last 3 Encounters:   07/21/22 115/72   07/21/21 122/82   12/01/20 112/70     Pulse Readings from Last 3 Encounters:   07/21/21 85   12/01/20 (!) 59   10/14/20 83           Physical Exam:    Visit Vitals  /72 (BP 1 Location: Left arm, BP Patient Position: Sitting, BP Cuff Size: Adult)   Ht 5' 5\" (1.651 m)   Wt 84.7 kg (186 lb 12.8 oz)   LMP 02/04/2010   SpO2 100%   BMI 31.09 kg/m²      Physical Exam  HENT:      Head: Normocephalic and atraumatic. Eyes:      Pupils: Pupils are equal, round, and reactive to light. Cardiovascular:      Rate and Rhythm: Normal rate and regular rhythm. Heart sounds: Normal heart sounds. No murmur heard. No friction rub. No gallop. Pulmonary:      Effort: Pulmonary effort is normal. No respiratory distress. Breath sounds: Normal breath sounds. No wheezing or rales. Chest:      Chest wall: No tenderness. Abdominal:      General: Bowel sounds are normal.      Palpations: Abdomen is soft. Musculoskeletal:         General: No tenderness. Skin:     General: Skin is warm and dry. Neurological:      Mental Status: She is alert and oriented to person, place, and time. EKG today shows: NSR, normal axis and intervals, no ST segment abnormalities    No results found for: CHOL, CHOLPOCT, CHOLX, CHLST, CHOLV, HDL, HDLPOC, HDLP, LDL, LDLCPOC, LDLC, DLDLP, VLDLC, VLDL, TGLX, TRIGL, TRIGP, TGLPOCT, CHHD, CHHDX      Impression and Plan:  Anny Pino is a 51 y.o. with:    +FH CHF in mother  H/o PACs? H/o benign murmur/ negative echo  S/p COVID    No further testing  RTC yearly prevention appts  35 mins    Pt says \"I have second degree AVB\" and there is no documentation of this. Dr. Benedict Layman note says PVCs but also dont see documentation on ekgs etc. 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 Dr. Khang Ramirez has given her reassurance and told her she doesn't need surveillance but she says really prefers to come yearly. Regardless, I told her ectopy is benign and nothing different to do. Today she's in NSR. Thank you for allowing me to participate in the care of your patient, please do not hesitate to call with questions or concerns. Follow-up and Dispositions    Return in about 1 year (around 7/21/2023).      [address] [place] [address],    Mauricio Flores, DO

## 2022-09-13 ENCOUNTER — TELEPHONE (OUTPATIENT)
Dept: CARDIOLOGY CLINIC | Age: 47
End: 2022-09-13

## 2022-09-13 NOTE — TELEPHONE ENCOUNTER
Patient called requested to have last OV sent to her PCP. Faxed to [phone] Dr Michel Ching. Confirmation received.

## 2022-12-21 ENCOUNTER — TRANSCRIBE ORDER (OUTPATIENT)
Dept: SCHEDULING | Age: 47
End: 2022-12-21

## 2022-12-21 DIAGNOSIS — Z12.31 VISIT FOR SCREENING MAMMOGRAM: Primary | ICD-10-CM

## 2022-12-22 ENCOUNTER — HOSPITAL ENCOUNTER (OUTPATIENT)
Dept: MAMMOGRAPHY | Age: 47
Discharge: HOME | End: 2022-12-22
Attending: FAMILY MEDICINE
Payer: COMMERCIAL

## 2022-12-22 DIAGNOSIS — Z12.31 VISIT FOR SCREENING MAMMOGRAM: ICD-10-CM

## 2022-12-22 PROCEDURE — 77063 BREAST TOMOSYNTHESIS BI: CPT

## 2023-07-06 ENCOUNTER — OFFICE VISIT (OUTPATIENT)
Age: 48
End: 2023-07-06
Payer: COMMERCIAL

## 2023-07-06 VITALS
HEART RATE: 66 BPM | SYSTOLIC BLOOD PRESSURE: 122 MMHG | HEIGHT: 65 IN | DIASTOLIC BLOOD PRESSURE: 80 MMHG | WEIGHT: 207 LBS | BODY MASS INDEX: 34.49 KG/M2 | OXYGEN SATURATION: 99 %

## 2023-07-06 DIAGNOSIS — R60.9 SWELLING: ICD-10-CM

## 2023-07-06 DIAGNOSIS — R01.1 CARDIAC MURMUR, UNSPECIFIED: ICD-10-CM

## 2023-07-06 DIAGNOSIS — R07.9 CHEST PAIN, UNSPECIFIED TYPE: Primary | ICD-10-CM

## 2023-07-06 PROCEDURE — 93000 ELECTROCARDIOGRAM COMPLETE: CPT | Performed by: INTERNAL MEDICINE

## 2023-07-06 PROCEDURE — 99214 OFFICE O/P EST MOD 30 MIN: CPT | Performed by: INTERNAL MEDICINE

## 2023-07-06 NOTE — PROGRESS NOTES
Gregg Orozco    PVCs, +FH  edema    HPI    Judy Mixon is a 51 y.o. is a very pleasant 54year old  female who is a local  with history of having a heart murmur diagnosed by Dr. Antony Flores, her gynecologist.  She had a completely normal clinical examination when except for very soft grade 4-0/4 systolic ejection murmur which was thought could simply be a flow murmur murmur or possibly related to bicuspid aortic valve. Echocardiogram which was completed in 2016 and was completely within normal limits with a normally trileaflet aortic valve without any significant sclerosis and it was felt that her heart murmur was simply a functional flow murmur. She has a mother who  of congestive heart failure and the patient herself is concerned about the development of heart failure over time so she has been following with our practice on an annual basis at her request.      2020 she was diagnosed with COVID-19 and several days later on 2020 she went to the emergency room due to some chest pressure and tightness as well as shortness of breath. The patient had had a dry cough and some myalgias for the first week after she was diagnosed with COVID-19, but due to the symptoms of chest pain she went to the ER for evaluation and had no significant abnormalities with a completely normal EKG although the patient said she did have some PVCs on telemetry. Her troponin I was less than 0.02 and her other laboratory tests were normal except for a little leukopenia with a white blood cell count of 3100 and 55 lymphocytes on the differential.    Can have some puffiness in her legs sporadically.      Past Medical History:   Diagnosis Date    Allergic rhinitis 2010    Arrhythmia     heart murmur    Asthma 2010    controlled without meds    Chronic back pain 2010    Fracture of trapezoidal bone of wrist ,96    left    GERD (gastroesophageal reflux disease) 2010

## 2023-07-06 NOTE — PROGRESS NOTES
Andrea Valderrama presents today for   Chief Complaint   Patient presents with    Follow-up     11 month f/u     Edema     Bilateral leg edema on/off       [place] Sherren Shadow preferred language for health care discussion is english/other. Is someone accompanying this pt? no    Is the patient using any DME equipment during OV? no    Depression Screening:  Depression: Not at risk    PHQ-2 Score: 0        Learning Assessment:  Who is the primary learner? Patient    What is the preferred language for health care of the primary learner? ENGLISH    How does the primary learner prefer to learn new concepts? DEMONSTRATION    Answered By patient    Relationship to Learner SELF           Pt currently taking Anticoagulant therapy? no    Pt currently taking Antiplatelet therapy ? no      Coordination of Care:  1. Have you been to the ER, urgent care clinic since your last visit? Hospitalized since your last visit? no    2. Have you seen or consulted any other health care providers outside of the [address] [place] [address] since your last visit? Include any pap smears or colon screening.  no

## 2024-02-06 ENCOUNTER — TRANSCRIBE ORDERS (OUTPATIENT)
Facility: HOSPITAL | Age: 49
End: 2024-02-06

## 2024-02-06 DIAGNOSIS — Z12.31 VISIT FOR SCREENING MAMMOGRAM: Primary | ICD-10-CM

## 2024-02-29 ENCOUNTER — HOSPITAL ENCOUNTER (OUTPATIENT)
Facility: HOSPITAL | Age: 49
Discharge: HOME | End: 2024-02-29
Payer: COMMERCIAL

## 2024-02-29 VITALS — BODY MASS INDEX: 34.55 KG/M2 | HEIGHT: 66 IN | WEIGHT: 215 LBS

## 2024-02-29 DIAGNOSIS — Z12.31 VISIT FOR SCREENING MAMMOGRAM: ICD-10-CM

## 2024-02-29 PROCEDURE — 77063 BREAST TOMOSYNTHESIS BI: CPT

## 2024-07-18 ENCOUNTER — OFFICE VISIT (OUTPATIENT)
Age: 49
End: 2024-07-18
Payer: COMMERCIAL

## 2024-07-18 VITALS
WEIGHT: 210 LBS | SYSTOLIC BLOOD PRESSURE: 122 MMHG | HEIGHT: 68 IN | DIASTOLIC BLOOD PRESSURE: 84 MMHG | OXYGEN SATURATION: 98 % | BODY MASS INDEX: 31.83 KG/M2 | HEART RATE: 60 BPM

## 2024-07-18 DIAGNOSIS — R07.9 CHEST PAIN, UNSPECIFIED TYPE: ICD-10-CM

## 2024-07-18 DIAGNOSIS — R01.1 CARDIAC MURMUR, UNSPECIFIED: ICD-10-CM

## 2024-07-18 DIAGNOSIS — R00.0 TACHYCARDIA: ICD-10-CM

## 2024-07-18 DIAGNOSIS — R00.2 PALPITATIONS: ICD-10-CM

## 2024-07-18 DIAGNOSIS — Z13.6 SCREENING FOR HEART DISEASE: ICD-10-CM

## 2024-07-18 DIAGNOSIS — R60.9 SWELLING: ICD-10-CM

## 2024-07-18 PROCEDURE — 1036F TOBACCO NON-USER: CPT | Performed by: INTERNAL MEDICINE

## 2024-07-18 PROCEDURE — G8428 CUR MEDS NOT DOCUMENT: HCPCS | Performed by: INTERNAL MEDICINE

## 2024-07-18 PROCEDURE — 93000 ELECTROCARDIOGRAM COMPLETE: CPT | Performed by: INTERNAL MEDICINE

## 2024-07-18 PROCEDURE — G8417 CALC BMI ABV UP PARAM F/U: HCPCS | Performed by: INTERNAL MEDICINE

## 2024-07-18 PROCEDURE — 99214 OFFICE O/P EST MOD 30 MIN: CPT | Performed by: INTERNAL MEDICINE

## 2024-07-18 RX ORDER — LISDEXAMFETAMINE DIMESYLATE 40 MG/1
40 TABLET, CHEWABLE ORAL DAILY
COMMUNITY
Start: 2023-05-03

## 2024-07-18 RX ORDER — ASPIRIN 325 MG
325 TABLET ORAL
COMMUNITY
Start: 2023-08-15

## 2024-07-18 RX ORDER — BUSPIRONE HYDROCHLORIDE 15 MG/1
15 TABLET ORAL NIGHTLY
COMMUNITY
Start: 2024-06-26

## 2024-07-18 ASSESSMENT — PATIENT HEALTH QUESTIONNAIRE - PHQ9
SUM OF ALL RESPONSES TO PHQ9 QUESTIONS 1 & 2: 0
SUM OF ALL RESPONSES TO PHQ QUESTIONS 1-9: 0
SUM OF ALL RESPONSES TO PHQ QUESTIONS 1-9: 0
1. LITTLE INTEREST OR PLEASURE IN DOING THINGS: NOT AT ALL
SUM OF ALL RESPONSES TO PHQ QUESTIONS 1-9: 0
2. FEELING DOWN, DEPRESSED OR HOPELESS: NOT AT ALL
SUM OF ALL RESPONSES TO PHQ QUESTIONS 1-9: 0

## 2024-07-18 NOTE — PROGRESS NOTES
Judy Freire    PVCs, +FH  edema    HPI    Judy Freire is a 48 year old female who is a local  with history of having a heart murmur diagnosed by Dr. Lobo, her gynecologist.  She had a completely normal clinical examination when except for very soft grade 1-2/6 systolic ejection murmur which was thought could simply be a flow murmur murmur or possibly related to bicuspid aortic valve. Echocardiogram which was completed in 2016 and was completely within normal limits with a normally trileaflet aortic valve without any significant sclerosis and it was felt that her heart murmur was simply a functional flow murmur.     She has a mother who  of congestive heart failure (unclear details) and the patient herself is concerned about the development of heart disease over time so she has been following with our practice on an annual basis at her request.      2020 she was diagnosed with COVID-19 and several days later on 2020 she went to the emergency room due to some chest pressure and tightness as well as shortness of breath.  The patient had had a dry cough and some myalgias for the first week after she was diagnosed with COVID-19, but due to the symptoms of chest pain she went to the ER for evaluation and had no significant abnormalities with a completely normal EKG although the patient said she did have some PVCs on telemetry.  Her troponin I was less than 0.02 and her other laboratory tests were normal except for a little leukopenia with a white blood cell count of 3100 and 55 lymphocytes on the differential.    Can have some puffiness in her legs sporadically.    Today she is complaining of mostly palpitations, feels her heart is racing for sometimes minutes at a time.     Past Medical History:   Diagnosis Date    Allergic rhinitis 2010    Arrhythmia     heart murmur    Asthma 2010    controlled without meds    Chronic back pain 2010    Fracture of

## 2024-07-18 NOTE — PROGRESS NOTES
Judy Freire presents today for   Chief Complaint   Patient presents with    Follow-up     1 yr f/u     Palpitations     Racing palps at times     Edema     Bilateral legs/feet edema at times        Judy Freire preferred language for health care discussion is english/other.    Is someone accompanying this pt? no    Is the patient using any DME equipment during OV? no    Depression Screening:  Depression: Not at risk (7/18/2024)    PHQ-2     PHQ-2 Score: 0        Learning Assessment:  Who is the primary learner? Patient    What is the preferred language for health care of the primary learner? ENGLISH    How does the primary learner prefer to learn new concepts? DEMONSTRATION    Answered By patient    Relationship to Learner SELF           Pt currently taking Anticoagulant therapy? no    Pt currently taking Antiplatelet therapy ? no      Coordination of Care:  1. Have you been to the ER, urgent care clinic since your last visit? Hospitalized since your last visit? no    2. Have you seen or consulted any other health care providers outside of the [facility] since your last visit? Include any pap smears or colon screening. no

## 2024-08-02 ENCOUNTER — HOSPITAL ENCOUNTER (OUTPATIENT)
Facility: HOSPITAL | Age: 49
Discharge: HOME | End: 2024-08-02
Attending: INTERNAL MEDICINE

## 2024-08-02 DIAGNOSIS — Z13.6 SCREENING FOR HEART DISEASE: ICD-10-CM

## 2024-08-02 PROCEDURE — 75571 CT HRT W/O DYE W/CA TEST: CPT

## 2024-08-29 ENCOUNTER — TELEPHONE (OUTPATIENT)
Age: 49
End: 2024-08-29

## 2024-08-29 NOTE — TELEPHONE ENCOUNTER
Called patient with results from CT CARDIAC CALCIUM SCORING.  Patient verbalized understanding.     Patient was inquiring about her event monitor results. She stated that she sent it back around 8-16. Advised her that it had not yet resulted.

## 2024-08-29 NOTE — TELEPHONE ENCOUNTER
----- Message from Dr. Anne Loving, DO sent at 8/21/2024 10:10 PM EDT -----  CAC is 0 which is low risk  ----- Message -----  From: Lori Simon, JILLIAN  Sent: 8/19/2024  10:53 AM EDT  To: Anne Loving, DO    Per your last office note:    Palpitations  LE edema  +FH CHF in mother  H/o PACs?  H/o benign murmur  S/p COVID     30 day MCT  CAC for risk  Will call with results, if WNL can see me yearly with EKG

## 2024-09-06 ENCOUNTER — TELEPHONE (OUTPATIENT)
Age: 49
End: 2024-09-06

## 2025-03-14 ENCOUNTER — TRANSCRIBE ORDERS (OUTPATIENT)
Facility: HOSPITAL | Age: 50
End: 2025-03-14

## 2025-03-14 DIAGNOSIS — Z12.31 OTHER SCREENING MAMMOGRAM: Primary | ICD-10-CM

## 2025-04-29 ENCOUNTER — HOSPITAL ENCOUNTER (OUTPATIENT)
Facility: HOSPITAL | Age: 50
Discharge: HOME | End: 2025-05-02
Payer: COMMERCIAL

## 2025-04-29 VITALS — BODY MASS INDEX: 31.84 KG/M2 | WEIGHT: 210.1 LBS | HEIGHT: 68 IN

## 2025-04-29 DIAGNOSIS — Z12.31 OTHER SCREENING MAMMOGRAM: ICD-10-CM

## 2025-04-29 PROCEDURE — 77063 BREAST TOMOSYNTHESIS BI: CPT

## 2025-06-05 ENCOUNTER — OFFICE VISIT (OUTPATIENT)
Age: 50
End: 2025-06-05
Payer: COMMERCIAL

## 2025-06-05 VITALS
OXYGEN SATURATION: 98 % | WEIGHT: 222 LBS | BODY MASS INDEX: 34.84 KG/M2 | DIASTOLIC BLOOD PRESSURE: 94 MMHG | HEART RATE: 69 BPM | SYSTOLIC BLOOD PRESSURE: 136 MMHG | HEIGHT: 67 IN

## 2025-06-05 DIAGNOSIS — M79.604 LEG PAIN, BILATERAL: ICD-10-CM

## 2025-06-05 DIAGNOSIS — R00.0 TACHYCARDIA: ICD-10-CM

## 2025-06-05 DIAGNOSIS — R01.1 CARDIAC MURMUR, UNSPECIFIED: ICD-10-CM

## 2025-06-05 DIAGNOSIS — R60.9 SWELLING: ICD-10-CM

## 2025-06-05 DIAGNOSIS — R22.43 LOCALIZED SWELLING OF BOTH LOWER LEGS: Primary | ICD-10-CM

## 2025-06-05 DIAGNOSIS — R00.2 PALPITATIONS: ICD-10-CM

## 2025-06-05 DIAGNOSIS — R07.9 CHEST PAIN, UNSPECIFIED TYPE: ICD-10-CM

## 2025-06-05 DIAGNOSIS — M79.605 LEG PAIN, BILATERAL: ICD-10-CM

## 2025-06-05 DIAGNOSIS — Z13.6 SCREENING FOR HEART DISEASE: ICD-10-CM

## 2025-06-05 PROCEDURE — 93000 ELECTROCARDIOGRAM COMPLETE: CPT | Performed by: INTERNAL MEDICINE

## 2025-06-05 PROCEDURE — G8428 CUR MEDS NOT DOCUMENT: HCPCS | Performed by: INTERNAL MEDICINE

## 2025-06-05 PROCEDURE — G8417 CALC BMI ABV UP PARAM F/U: HCPCS | Performed by: INTERNAL MEDICINE

## 2025-06-05 PROCEDURE — 3017F COLORECTAL CA SCREEN DOC REV: CPT | Performed by: INTERNAL MEDICINE

## 2025-06-05 PROCEDURE — 99214 OFFICE O/P EST MOD 30 MIN: CPT | Performed by: INTERNAL MEDICINE

## 2025-06-05 PROCEDURE — 1036F TOBACCO NON-USER: CPT | Performed by: INTERNAL MEDICINE

## 2025-06-05 ASSESSMENT — PATIENT HEALTH QUESTIONNAIRE - PHQ9
SUM OF ALL RESPONSES TO PHQ QUESTIONS 1-9: 0
SUM OF ALL RESPONSES TO PHQ QUESTIONS 1-9: 0
1. LITTLE INTEREST OR PLEASURE IN DOING THINGS: NOT AT ALL
SUM OF ALL RESPONSES TO PHQ QUESTIONS 1-9: 0
SUM OF ALL RESPONSES TO PHQ QUESTIONS 1-9: 0
2. FEELING DOWN, DEPRESSED OR HOPELESS: NOT AT ALL

## 2025-06-05 NOTE — PROGRESS NOTES
Judy Freire    Chief Complaint   Patient presents with    Follow-up     1 yr f/u     Edema     Bilateral legs/ankle edema constant lately very hurtful        HPI    Judy Freire is a 49 y.o. here for follow up of palpitations and murmur.    As you know, Lauri is a  who was told many years ago that she has a murmur. Her mother  of CHF so she has been following with our practice for quite some time.     Over the years she has had various complaints, mostly related to palpitations and LE edema. Her echo was completely normal with no significant valvular pathology, normal pressures, normal diastolic function even. Her CAC was 0. She c/o palpitations- MCT showed absolutely no ectopy.    She is on her feet all day, and says for last 2 weeks legs are hurting and were very swollen L>R. She brings pictures. Doesn't think eating too much salt. She was screened for VANDANA and didn't meet criteria on home sleep test. She had a total hysterectomy years ago and is on HRT per her GYN.    Past Medical History:   Diagnosis Date    Allergic rhinitis 2010    Arrhythmia     heart murmur    Asthma 2010    controlled without meds    Chronic back pain 2010    Fracture of trapezoidal bone of wrist ,96    left    GERD (gastroesophageal reflux disease) 2010    Migraine headache 2010    Osteoarthritis of left knee 10/6/2016    Osteoarthritis of left knee 10/6/2016    Seborrheic dermatitis 2010    Sleep apnea     was tested, but does not need a CPAP       Past Surgical History:   Procedure Laterality Date    HYSTERECTOMY (CERVIX STATUS UNKNOWN)      ORTHOPEDIC SURGERY  summer '07    left foot screw, fx right foot '    OVARY REMOVAL      TONSILLECTOMY         Current Outpatient Medications   Medication Sig Dispense Refill    Cyanocobalamin (VITAMIN B 12) 100 MCG LOZG Take 1 tablet by mouth daily      busPIRone (BUSPAR) 15 MG tablet Take 15 mg by mouth nightly at bedtime.

## 2025-06-05 NOTE — PATIENT INSTRUCTIONS
If you have not heard from the central scheduler to schedule your testing in 48 hours, please call [phone].

## 2025-08-14 ENCOUNTER — HOSPITAL ENCOUNTER (OUTPATIENT)
Age: 50
Discharge: HOME | End: 2025-08-16
Payer: COMMERCIAL

## 2025-08-14 DIAGNOSIS — R22.43 LOCALIZED SWELLING OF BOTH LOWER LEGS: ICD-10-CM

## 2025-08-14 DIAGNOSIS — M79.605 LEG PAIN, BILATERAL: ICD-10-CM

## 2025-08-14 DIAGNOSIS — M79.604 LEG PAIN, BILATERAL: ICD-10-CM

## 2025-08-14 PROCEDURE — 93970 EXTREMITY STUDY: CPT

## 2025-08-18 ENCOUNTER — RESULTS FOLLOW-UP (OUTPATIENT)
Age: 50
End: 2025-08-18

## 2025-08-19 ENCOUNTER — HOSPITAL ENCOUNTER (OUTPATIENT)
Age: 50
Discharge: HOME | End: 2025-08-21
Payer: COMMERCIAL

## 2025-08-19 DIAGNOSIS — M79.605 LEG PAIN, BILATERAL: ICD-10-CM

## 2025-08-19 DIAGNOSIS — R22.43 LOCALIZED SWELLING OF BOTH LOWER LEGS: ICD-10-CM

## 2025-08-19 DIAGNOSIS — M79.604 LEG PAIN, BILATERAL: ICD-10-CM

## 2025-08-19 PROCEDURE — 93922 UPR/L XTREMITY ART 2 LEVELS: CPT

## 2025-08-21 LAB
VAS LEFT ABI: 1.22
VAS LEFT ARM BP: 120 MMHG
VAS LEFT DORSALIS PEDIS BP: 140 MMHG
VAS LEFT PTA BP: 148 MMHG
VAS LEFT TBI: 1.27
VAS LEFT TOE PRESSURE: 154 MMHG
VAS RIGHT ABI: 1.26
VAS RIGHT ARM BP: 121 MMHG
VAS RIGHT DORSALIS PEDIS BP: 153 MMHG
VAS RIGHT PTA BP: 144 MMHG
VAS RIGHT TBI: 1.26
VAS RIGHT TOE PRESSURE: 152 MMHG

## 2025-08-27 ENCOUNTER — RESULTS FOLLOW-UP (OUTPATIENT)
Age: 50
End: 2025-08-27